# Patient Record
Sex: MALE | Race: WHITE | NOT HISPANIC OR LATINO | Employment: FULL TIME | URBAN - METROPOLITAN AREA
[De-identification: names, ages, dates, MRNs, and addresses within clinical notes are randomized per-mention and may not be internally consistent; named-entity substitution may affect disease eponyms.]

---

## 2017-12-15 ENCOUNTER — HOSPITAL ENCOUNTER (EMERGENCY)
Facility: HOSPITAL | Age: 27
Discharge: HOME | End: 2017-12-15
Attending: EMERGENCY MEDICINE
Payer: COMMERCIAL

## 2017-12-15 VITALS
HEART RATE: 99 BPM | RESPIRATION RATE: 19 BRPM | OXYGEN SATURATION: 96 % | DIASTOLIC BLOOD PRESSURE: 65 MMHG | TEMPERATURE: 98 F | SYSTOLIC BLOOD PRESSURE: 139 MMHG

## 2017-12-15 DIAGNOSIS — F41.9 ANXIETY DISORDER, UNSPECIFIED TYPE: Primary | ICD-10-CM

## 2017-12-15 PROBLEM — F39 MOOD DISORDER: Status: ACTIVE | Noted: 2017-12-15

## 2017-12-15 LAB
ALBUMIN SERPL BCP-MCNC: 4.5 G/DL
ALP SERPL-CCNC: 80 U/L
ALT SERPL W/O P-5'-P-CCNC: 35 U/L
AMPHET+METHAMPHET UR QL: NEGATIVE
ANION GAP SERPL CALC-SCNC: 8 MMOL/L
AST SERPL-CCNC: 23 U/L
BARBITURATES UR QL SCN>200 NG/ML: NEGATIVE
BASOPHILS # BLD AUTO: 0.01 K/UL
BASOPHILS NFR BLD: 0.2 %
BENZODIAZ UR QL SCN>200 NG/ML: NORMAL
BILIRUB SERPL-MCNC: 0.5 MG/DL
BILIRUB UR QL STRIP: NEGATIVE
BUN SERPL-MCNC: 15 MG/DL
BZE UR QL SCN: NEGATIVE
CALCIUM SERPL-MCNC: 9.5 MG/DL
CANNABINOIDS UR QL SCN: NORMAL
CHLORIDE SERPL-SCNC: 104 MMOL/L
CLARITY UR REFRACT.AUTO: CLEAR
CO2 SERPL-SCNC: 29 MMOL/L
COLOR UR AUTO: YELLOW
CREAT SERPL-MCNC: 1.1 MG/DL
CREAT UR-MCNC: 153 MG/DL
DIFFERENTIAL METHOD: ABNORMAL
EOSINOPHIL # BLD AUTO: 0 K/UL
EOSINOPHIL NFR BLD: 0.3 %
ERYTHROCYTE [DISTWIDTH] IN BLOOD BY AUTOMATED COUNT: 12.9 %
EST. GFR  (AFRICAN AMERICAN): >60 ML/MIN/1.73 M^2
EST. GFR  (NON AFRICAN AMERICAN): >60 ML/MIN/1.73 M^2
GLUCOSE SERPL-MCNC: 102 MG/DL
GLUCOSE UR QL STRIP: NEGATIVE
HCT VFR BLD AUTO: 39.9 %
HGB BLD-MCNC: 13.9 G/DL
HGB UR QL STRIP: NEGATIVE
IMM GRANULOCYTES # BLD AUTO: 0.04 K/UL
IMM GRANULOCYTES NFR BLD AUTO: 0.7 %
KETONES UR QL STRIP: NEGATIVE
LEUKOCYTE ESTERASE UR QL STRIP: NEGATIVE
LYMPHOCYTES # BLD AUTO: 1.8 K/UL
LYMPHOCYTES NFR BLD: 30.4 %
MAGNESIUM SERPL-MCNC: 2.3 MG/DL
MCH RBC QN AUTO: 32.1 PG
MCHC RBC AUTO-ENTMCNC: 34.8 G/DL
MCV RBC AUTO: 92 FL
METHADONE UR QL SCN>300 NG/ML: NEGATIVE
MONOCYTES # BLD AUTO: 0.5 K/UL
MONOCYTES NFR BLD: 8.8 %
NEUTROPHILS # BLD AUTO: 3.6 K/UL
NEUTROPHILS NFR BLD: 59.6 %
NITRITE UR QL STRIP: NEGATIVE
NRBC BLD-RTO: 0 /100 WBC
OPIATES UR QL SCN: NORMAL
PCP UR QL SCN>25 NG/ML: NEGATIVE
PH UR STRIP: 5 [PH] (ref 5–8)
PLATELET # BLD AUTO: 272 K/UL
PMV BLD AUTO: 10.6 FL
POTASSIUM SERPL-SCNC: 4.2 MMOL/L
PROT SERPL-MCNC: 7.9 G/DL
PROT UR QL STRIP: NEGATIVE
RBC # BLD AUTO: 4.33 M/UL
SODIUM SERPL-SCNC: 141 MMOL/L
SP GR UR STRIP: 1.02 (ref 1–1.03)
TOXICOLOGY INFORMATION: NORMAL
TSH SERPL DL<=0.005 MIU/L-ACNC: 1.23 UIU/ML
URN SPEC COLLECT METH UR: NORMAL
UROBILINOGEN UR STRIP-ACNC: NEGATIVE EU/DL
WBC # BLD AUTO: 5.99 K/UL

## 2017-12-15 PROCEDURE — 25000003 PHARM REV CODE 250: Performed by: STUDENT IN AN ORGANIZED HEALTH CARE EDUCATION/TRAINING PROGRAM

## 2017-12-15 PROCEDURE — 96360 HYDRATION IV INFUSION INIT: CPT

## 2017-12-15 PROCEDURE — 84443 ASSAY THYROID STIM HORMONE: CPT

## 2017-12-15 PROCEDURE — 99284 EMERGENCY DEPT VISIT MOD MDM: CPT | Mod: 25

## 2017-12-15 PROCEDURE — 93010 ELECTROCARDIOGRAM REPORT: CPT | Mod: ,,, | Performed by: INTERNAL MEDICINE

## 2017-12-15 PROCEDURE — 80053 COMPREHEN METABOLIC PANEL: CPT

## 2017-12-15 PROCEDURE — 81003 URINALYSIS AUTO W/O SCOPE: CPT

## 2017-12-15 PROCEDURE — 85025 COMPLETE CBC W/AUTO DIFF WBC: CPT

## 2017-12-15 PROCEDURE — 83735 ASSAY OF MAGNESIUM: CPT

## 2017-12-15 PROCEDURE — 93005 ELECTROCARDIOGRAM TRACING: CPT

## 2017-12-15 PROCEDURE — 80307 DRUG TEST PRSMV CHEM ANLYZR: CPT

## 2017-12-15 PROCEDURE — 80175 DRUG SCREEN QUAN LAMOTRIGINE: CPT

## 2017-12-15 PROCEDURE — 99283 EMERGENCY DEPT VISIT LOW MDM: CPT | Mod: ,,, | Performed by: EMERGENCY MEDICINE

## 2017-12-15 RX ORDER — LAMOTRIGINE 25 MG/1
25 TABLET ORAL DAILY
COMMUNITY
End: 2017-12-15 | Stop reason: SINTOL

## 2017-12-15 RX ADMIN — SODIUM CHLORIDE, SODIUM LACTATE, POTASSIUM CHLORIDE, AND CALCIUM CHLORIDE 1000 ML: .6; .31; .03; .02 INJECTION, SOLUTION INTRAVENOUS at 02:12

## 2017-12-15 NOTE — SUBJECTIVE & OBJECTIVE
Patient History           Medical as of 12/15/2017     Past Medical History     Diagnosis Date Comments Source    Anxiety -- -- Provider    History of cold sores -- -- Provider    HSV infection -- oral Provider    Hx of psychiatric care -- Previous medications: Celexa, gabapentin, Abilify Provider    Rupali -- elevated mood, increased goal-directed activity, impulsivity, grandiosity, pressured speech Provider    Psychiatric problem -- -- Provider    Therapy -- Psychiatrist: Dr. Urbina.  Provider          Pertinent Negatives     Diagnosis Date Noted Comments Source    History of psychiatric hospitalization 12/15/2017 -- Provider    Hyperlipidemia 10/2/2014 -- Provider    Suicide attempt 12/15/2017 -- Provider                  Surgical as of 12/15/2017    Past Surgical History: Patient provided no pertinent surgical history.           Family as of 12/15/2017     Problem Relation Name Age of Onset Comments Source    No Known Problems Mother -- -- -- Provider    Hyperlipidemia Father -- -- -- Provider    No Known Problems Sister -- -- -- Provider    No Known Problems Brother -- -- -- Provider    Cancer Maternal Grandfather -- -- melanoma Provider    Hyperlipidemia Paternal Grandfather -- -- -- Provider    Heart disease Paternal Grandfather -- -- -- Provider    Hypertension Paternal Grandfather -- -- -- Provider    Diabetes Neg Hx -- -- -- Provider            Tobacco Use as of 12/15/2017     Smoking Status Smoking Start Date Smoking Quit Date Packs/day Years Used    Former Smoker -- 8/2/2014 0.25 2.50    Types Comments Smokeless Tobacco Status Smokeless Tobacco Quit Date Source    -- -- Unknown -- Provider            Alcohol Use as of 12/15/2017     Alcohol Use Drinks/Week Alcohol/Week Comments Source    Yes -- -- 4-5 glasses of wine every 2 weeks Provider            Drug Use as of 12/15/2017     Drug Use Types Frequency Comments Source    Yes -- -- Vapes THC oil X1 month  Provider            Sexual Activity as of  12/15/2017     Sexually Active Birth Control Partners Comments Source    Yes OCP Female -- Provider            Activities of Daily Living as of 12/15/2017     Activities of Daily Living Question Response Comments Source    Patient feels they ought to cut down on drinking/drug use Not Asked -- Provider    Patient annoyed by others criticizing their drinking/drug use Not Asked -- Provider    Patient has felt bad or guilty about drinking/drug use Not Asked -- Provider    Patient has had a drink/used drugs as an eye opener in the AM Not Asked -- Provider            Social Documentation as of 12/15/2017    **None**           Occupational as of 12/15/2017     Occupation Employer Comments Source    [employer]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 No      Service Yes [employer]    Primary Leisure Activity      Spirituality       Past Medical History:   Diagnosis Date    Anxiety     History of cold sores     HSV infection     oral    Hx of psychiatric care     Previous medications: Celexa, gabapentin, Abilify    Rupali     elevated mood, increased goal-directed activity, impulsivity, grandiosity, pressured speech    Psychiatric problem     Therapy     Psychiatrist: Dr. Urbina.      History reviewed. No pertinent surgical history.  Family History     Problem Relation (Age of Onset)    Cancer Maternal Grandfather     Heart disease Paternal Grandfather    Hyperlipidemia Father, Paternal Grandfather    Hypertension Paternal Grandfather    No Known Problems Mother, Sister, Brother        Social History Main Topics    Smoking status: Former Smoker     Packs/day: 0.25     Years: 2.50     Quit date: 8/2/2014    Smokeless tobacco: Not on file    Alcohol use Yes      Comment: 4-5 glasses of wine every 2 weeks    Drug use: Yes      Comment: Vapes THC oil X1 month     Sexual activity: Yes     Partners: Female     Birth control/ protection: OCP     Review of patient's allergies indicates:  No Known Allergies    No current facility-administered medications on file prior to encounter.      Current Outpatient Prescriptions on File Prior to Encounter   Medication Sig    clonazePAM (KLONOPIN) 2 MG Tab     duloxetine (CYMBALTA) 20 MG capsule     propranolol (INDERAL) 40 MG tablet     valacyclovir (VALTREX) 1000 MG tablet     dextromethorphan-guaifenesin  mg/5 ml (ROBITUSSIN-DM)  mg/5 mL liquid Take 5 mLs by mouth every 4 (four) hours as needed.     Psychotherapeutics     None        Review of Systems   Constitutional: Negative for chills and fever.   HENT: Negative for sore throat.    Respiratory: Negative for cough and shortness of breath.    Cardiovascular: Negative for chest pain.   Gastrointestinal: Negative for abdominal pain, constipation, diarrhea, nausea and vomiting.   Genitourinary: Negative for dysuria and hematuria.   Skin: Negative for rash.        Does endorse skin picking when stressed   Neurological: Negative for headaches.   Psychiatric/Behavioral: Negative for hallucinations, self-injury and suicidal ideas. The patient is nervous/anxious.      Strengths and Liabilities: Strength: Patient accepts guidance/feedback, Strength: Patient is expressive/articulate., Strength: Patient is intelligent., Strength: Patient is motivated for change., Strength: Patient is physically healthy., Strength: Patient has  "positive support network., Strength: Patient has reasonable judgment.    Objective:     Vital Signs (Most Recent):  Temp: 98.3 °F (36.8 °C) (12/15/17 1218)  Pulse: 99 (12/15/17 1632)  Resp: 19 (12/15/17 1437)  BP: 139/65 (12/15/17 1632)  SpO2: 96 % (12/15/17 1633) Vital Signs (24h Range):  Temp:  [98.3 °F (36.8 °C)] 98.3 °F (36.8 °C)  Pulse:  [] 99  Resp:  [19-20] 19  SpO2:  [95 %-99 %] 96 %  BP: (105-149)/(60-92) 139/65           There is no height or weight on file to calculate BMI.    No intake or output data in the 24 hours ending 12/15/17 1707    Physical Exam   Constitutional: He appears well-developed and well-nourished. No distress.   HENT:   Head: Normocephalic and atraumatic.   Neck: Normal range of motion.   Pulmonary/Chest: Effort normal. No respiratory distress.   Abdominal: He exhibits no distension.   Musculoskeletal: Normal range of motion. He exhibits no edema.   AIMS: 0   Skin: Skin is warm and dry.   Psychiatric:   Psychiatric Mental Status Exam:  Arousal: alert  Sensorium/Orientation: oriented to person, place, situation, time/date  Behavior/Cooperation: normal, cooperative, eye contact normal   Psychomotor: unremarkable and within normal limits  Speech: rapid speech, but conversational volume, rhythm, and tone  Language: intact  Mood: "melancoly"   Affect: appropriate, and reactive to content  Thought Process: linear, logical  Thought Content:   Auditory hallucinations: NO  Visual hallucinations: NO  Paranoia: NO  Delusions:  NO  Suicidal ideation: NO  Homicidal ideation: NO  Attention/Concentration:  spelled "HOUSE" forwards and backwards  able to focus  Memory:    Recent: Intact   Remote: Intact  Fund of Knowledge: Aware of current events and Vocabulary appropriate    Abstract reasoning: proverbs were abstract  Insight: Intact  Judgment:Intact      NEUROLOGICAL EXAMINATION:     MENTAL STATUS        II: Visual fields full to confrontation  III,IV,VI: PERRLA, EOMI. No nystagmus   V: " sensation to light touch intact throughout face  VII: Symmetric smile and brow raise  VIII: Able to hear finger rub equally bilaterally   IX,X: Symmetric elevation of the soft palate   XI: Able to shrug shoulders bilaterally   XII: tongue midline on protrusion      Significant Labs:   Last 24 Hours:   Recent Lab Results       12/15/17  1434 12/15/17  1344      Benzodiazepines Presumptive Positive      Methadone metabolites Negative      Phencyclidine Negative      Immature Granulocytes  0.7(H)     Immature Grans (Abs)  0.04     Albumin  4.5     Alkaline Phosphatase  80     ALT  35     Amphetamine Screen, Ur Negative      Anion Gap  8     Appearance, UA Clear      AST  23     Barbiturate Screen, Ur Negative      Baso #  0.01     Basophil%  0.2     Bilirubin (UA) Negative      Total Bilirubin  0.5  Comment:  For infants and newborns, interpretation of results should be based  on gestational age, weight and in agreement with clinical  observations.  Premature Infant recommended reference ranges:  Up to 24 hours.............<8.0 mg/dL  Up to 48 hours............<12.0 mg/dL  3-5 days..................<15.0 mg/dL  6-29 days.................<15.0 mg/dL       BUN, Bld  15     Calcium  9.5     Chloride  104     CO2  29     Cocaine (Metab.) Negative      Color, UA Yellow      Creatinine  1.1     Creatinine, Random Ur 153.0  Comment:  The random urine reference ranges provided were established   for 24 hour urine collections.  No reference ranges exist for  random urine specimens.  Correlate clinically.        Differential Method  Automated     eGFR if African American  >60.0     eGFR if non   >60.0  Comment:  Calculation used to obtain the estimated glomerular filtration  rate (eGFR) is the CKD-EPI equation.        Eos #  0.0     Eosinophil%  0.3     Glucose  102     Glucose, UA Negative      Gran #  3.6     Gran%  59.6     Hematocrit  39.9(L)     Hemoglobin  13.9(L)     Ketones, UA Negative      Leukocytes,  UA Negative      Lymph #  1.8     Lymph%  30.4     Magnesium  2.3     MCH  32.1(H)     MCHC  34.8     MCV  92     Mono #  0.5     Mono%  8.8     MPV  10.6     Nitrite, UA Negative      nRBC  0     Occult Blood UA Negative      Opiate Scrn, Ur Presumptive Positive      pH, UA 5.0      Platelets  272     Potassium  4.2     Total Protein  7.9     Protein, UA Negative  Comment:  Recommend a 24 hour urine protein or a urine   protein/creatinine ratio if globulin induced proteinuria is  clinically suspected.        RBC  4.33(L)     RDW  12.9     Sodium  141     Specific Gravity, UA 1.025      Specimen UA Urine, Clean Catch      Marijuana (THC) Metabolite Presumptive Positive      Toxicology Information SEE COMMENT  Comment:  This screen includes the following classes of drugs at the   listed cut-off:  Benzodiazepines                  200 ng/ml  Methadone                        300 ng/ml  Cocaine metabolite               300 ng/ml  Opiates                          300 ng/ml  Barbiturates                     200 ng/ml  Amphetamines                    1000 ng/ml  Marijuana metabs (THC)            50 ng/ml  Phencyclidine (PCP)               25 ng/ml  High concentrations of Diphenhydramine may cross-react with  Phencyclidine PCP screening immunoassay giving a false   positive result.  High concentrations of Methylenedioxymethamphetamine (MDMA aka  Ectasy) and other structurally similar compounds may cross-   react with the Amphetamine/Methamphetamine screening   immunoassay giving a false positive result.  A metabolite of the anti-HIV drug Sustiva () may cause  false positive results in the Marijuana metabolite (THC)   screening assay.  Note: This exception list includes only more common   interferants in toxicology screen testing.  Because of many   cross-reactantspositive results on toxicology drug screens   should be confirmed whenever results do not correlate with   clinical presentation.  This report is intended  for use in clinical monitoring and  management of patients. It is not intended for use in   employment related drug testing.  Because of any cross-reactants, positive results on toxicology  drug screens should be confirmed whenever results do not  correlate with clinical presentation.  Presumptive positive results are unconfirmed and may be used   only for medical purposes.        TSH  1.234     Urobilinogen, UA Negative      WBC  5.99           Significant Imaging: CT: I have reviewed all pertinent results/findings within the past 24 hours. No intracranial abnormality identified.

## 2017-12-15 NOTE — ED NOTES
Patient identifiers verified and correct for Samuel Mcgraw.    LOC: The patient is awake, alert and aware of environment, the patient is oriented x 3 and speaking appropriately.  APPEARANCE: Patient resting comfortably and in no acute distress, patient is clean and well groomed, patient's clothing is properly fastened.  SKIN: The skin is warm and dry, color consistent with ethnicity, patient has normal skin turgor and moist mucus membranes, rash noted to face and neck.  MUSCULOSKELETAL: Patient moving all extremities spontaneously, no obvious swelling or deformities noted.  RESPIRATORY: Airway is open and patent, respirations are spontaneous, patient has a normal effort and rate, no accessory muscle use noted.  CARDIAC: Patient is tachycardic with a regular rhythm, no peripheral edema noted, capillary refill < 3 seconds.  ABDOMEN: Soft and non tender to palpation, no distention noted.  NEUROLOGIC: Eyes open spontaneously, behavior appropriate to situation, follows commands, facial expression symmetrical, bilateral hand grasp equal and even, purposeful motor response noted, normal sensation in all extremities when touched with a finger.

## 2017-12-15 NOTE — ASSESSMENT & PLAN NOTE
- Treated by outside provider. Has been on current medications for years without changes except for addition of Lamictal yesterday and use of Seroquel for the first time in awhile. Patient also uses vaporized THC oil which could complicate the picture. Returning to baseline, per patient's family at bedside.  - No current SI, HI, AVH, delusions, or paranoia. Able to hold a 2-way conversation. Contracts for safety, and has appointment with outpatient provider within 2 weeks.  - Instructed to hold Lamictal and Seroquel until discussing with outpatient provider Monday, or at next appointment.    - Dispo/Legal Status: Patient does not meet criteria for PEC or inpatient psychiatric admission at this time. Recommend to rescind PEC if one was placed. Patient is not currently an imminent danger to self or others and is not gravely disabled due to a psychiatric illness. Patient is cleared from a psychiatric standpoint and can be discharged to home/self-care  - Medication Recommendations: Resume home meds with the exception of Lamictal and Seroquel  - Follow-up with: Dr. Urbina      - Keep all follow up appointments.  - Abstain from all drugs and alcohol.  - Call the crisis line at: [phone] for help in a crisis.  - Call 911 for any emergent situation.  - Return to the hospital or call your primary care physician if you have any worsening of your condition or experience suicidal or homicidal thoughts.

## 2017-12-15 NOTE — ED TRIAGE NOTES
"Pt presents to the ED c/o medication reaction to lamictal. Yesterday was the first dose of lamictal. No new rash. Pt has pressure speech, rambling, flight of ideas. Pt is fidgeting and hyperactive. Denies SI/HI, AV/VH. AAOx4. Pt states he sees a psychiatrist for his anxiety. Pt's wife states the pt's baseline is "normal speech." Pt's wife states the pt doubled his dose of propranolol and Klonopin this AM.  "

## 2017-12-15 NOTE — HPI
"Patient presented today after 1 day of "not feeling right." States he saw his outpatient psychiatrist (Dr. Urbina) yesterday, and they decided to start Lamictal. Today was only the 2nd day he has taken the medication. Patient also takes Cymbalta, propranolol, Klonopin PRN, and Seroquel PRN. Has been on these medications for years, but rarely takes Seroquel; however, he did take Seroquel last night for the first time in a long time. Patient report that he usually has pretty rapid speech at baseline, but felt more ramped up today than usual. Mother and wife at bedside agree that patient is returning to baseline currently, but were scared this morning. Patient reports that he has only been diagnosed with and anxiety disorder, but that he and his provider were exploring a possible Bipolar disorder diagnosis. Does endorse episodes of decreased need for sleep with associated impulsivity, grandiosity, pressured speech, and racing thoughts that would last for 3-4 days and occur ~1/month with periods of depression between episodes. Denies any current or past SI, HI, AVH, delusions, or paranoia. Denies any somatic complaints at this time. Feels safe to be discharged, can contract for safety, is aware of return criteria, and has a follow up appointment with his outpatient provider within the next 2 weeks.  "

## 2017-12-15 NOTE — CONSULTS
"[facility]  Psychiatry  Consult Note    Patient Name: Samuel Mcgraw  MRN: [id number]   Code Status: No Order  Admission Date: 12/15/2017  Hospital Length of Stay: 0 days  Attending Physician: No att. providers found  Primary Care Provider: Primary Doctor No    Current Legal Status: N/A    Patient information was obtained from patient, spouse/SO, parent and ER records.   Inpatient consult to Psychiatry  Consult performed by: EYAD WOLFE  Consult ordered by: ALEKS EASON        Subjective:     Principal Problem:<principal problem not specified>    Chief Complaint:  "I want to see if my medicines are okay"     HPI: Patient presented today after 1 day of "not feeling right." States he saw his outpatient psychiatrist (Dr. Urbina) yesterday, and they decided to start Lamictal. Today was only the 2nd day he has taken the medication. Patient also takes Cymbalta, propranolol, Klonopin PRN, and Seroquel PRN. Has been on these medications for years, but rarely takes Seroquel; however, he did take Seroquel last night for the first time in a long time. Patient report that he usually has pretty rapid speech at baseline, but felt more ramped up today than usual. Mother and wife at bedside agree that patient is returning to baseline currently, but were scared this morning. Patient reports that he has only been diagnosed with and anxiety disorder, but that he and his provider were exploring a possible Bipolar disorder diagnosis. Does endorse episodes of decreased need for sleep with associated impulsivity, grandiosity, pressured speech, and racing thoughts that would last for 3-4 days and occur ~1/month with periods of depression between episodes. Denies any current or past SI, HI, AVH, delusions, or paranoia. Denies any somatic complaints at this time. Feels safe to be discharged, can contract for safety, is aware of return criteria, and has a follow up appointment with his outpatient provider within " the next 2 weeks.    Hospital Course: No notes on file         Patient History           Medical as of 12/15/2017     Past Medical History     Diagnosis Date Comments Source    Anxiety -- -- Provider    History of cold sores -- -- Provider    HSV infection -- oral Provider    Hx of psychiatric care -- Previous medications: Celexa, gabapentin, Abilify Provider    Rupali -- elevated mood, increased goal-directed activity, impulsivity, grandiosity, pressured speech Provider    Psychiatric problem -- -- Provider    Therapy -- Psychiatrist: Dr. Urbina.  Provider          Pertinent Negatives     Diagnosis Date Noted Comments Source    History of psychiatric hospitalization 12/15/2017 -- Provider    Hyperlipidemia 10/2/2014 -- Provider    Suicide attempt 12/15/2017 -- Provider                  Surgical as of 12/15/2017    Past Surgical History: Patient provided no pertinent surgical history.           Family as of 12/15/2017     Problem Relation Name Age of Onset Comments Source    No Known Problems Mother -- -- -- Provider    Hyperlipidemia Father -- -- -- Provider    No Known Problems Sister -- -- -- Provider    No Known Problems Brother -- -- -- Provider    Cancer Maternal Grandfather -- -- melanoma Provider    Hyperlipidemia Paternal Grandfather -- -- -- Provider    Heart disease Paternal Grandfather -- -- -- Provider    Hypertension Paternal Grandfather -- -- -- Provider    Diabetes Neg Hx -- -- -- Provider            Tobacco Use as of 12/15/2017     Smoking Status Smoking Start Date Smoking Quit Date Packs/day Years Used    Former Smoker -- 8/2/2014 0.25 2.50    Types Comments Smokeless Tobacco Status Smokeless Tobacco Quit Date Source    -- -- Unknown -- Provider            Alcohol Use as of 12/15/2017     Alcohol Use Drinks/Week Alcohol/Week Comments Source    Yes -- -- 4-5 glasses of wine every 2 weeks Provider            Drug Use as of 12/15/2017     Drug Use Types Frequency Comments Source    Yes -- -- Vapes THC  oil X1 month  Provider            Sexual Activity as of 12/15/2017     Sexually Active Birth Control Partners Comments Source    Yes OCP Female -- Provider            Activities of Daily Living as of 12/15/2017     Activities of Daily Living Question Response Comments Source    Patient feels they ought to cut down on drinking/drug use Not Asked -- Provider    Patient annoyed by others criticizing their drinking/drug use Not Asked -- Provider    Patient has felt bad or guilty about drinking/drug use Not Asked -- Provider    Patient has had a drink/used drugs as an eye opener in the AM Not Asked -- Provider            Social Documentation as of 12/15/2017    **None**           Occupational as of 12/15/2017     Occupation Employer Comments Source    [employer] Provider            Socioeconomic as of 12/15/2017     Marital Status Spouse Name Number of Children Years Education Preferred Language Ethnicity Race Source     -- -- -- English /White White Provider         Pertinent History Q A Comments    as of 12/15/2017 Lives with spouse     Place in Birth Order      Lives in      Number of Siblings      Raised by biological parents     Legal Involvement none     Childhood Trauma      Criminal History of      Financial Status employed     Highest Level of Education Bachelor's Degree     Does patient have access to a firearm? No      Service Yes [employer]    Primary Leisure Activity      Spirituality       Past Medical History:   Diagnosis Date    Anxiety     History of cold sores     HSV infection     oral    Hx of psychiatric care     Previous medications: Celexa, gabapentin, Abilify    Rupali     elevated mood, increased goal-directed activity, impulsivity, grandiosity, pressured speech    Psychiatric problem     Therapy     Psychiatrist: Dr. Urbina.      History reviewed. No pertinent surgical history.  Family History     Problem  Relation (Age of Onset)    Cancer Maternal Grandfather    Heart disease Paternal Grandfather    Hyperlipidemia Father, Paternal Grandfather    Hypertension Paternal Grandfather    No Known Problems Mother, Sister, Brother        Social History Main Topics    Smoking status: Former Smoker     Packs/day: 0.25     Years: 2.50     Quit date: 8/2/2014    Smokeless tobacco: Not on file    Alcohol use Yes      Comment: 4-5 glasses of wine every 2 weeks    Drug use: Yes      Comment: Vapes THC oil X1 month     Sexual activity: Yes     Partners: Female     Birth control/ protection: OCP     Review of patient's allergies indicates:  No Known Allergies    No current facility-administered medications on file prior to encounter.      Current Outpatient Prescriptions on File Prior to Encounter   Medication Sig    clonazePAM (KLONOPIN) 2 MG Tab     duloxetine (CYMBALTA) 20 MG capsule     propranolol (INDERAL) 40 MG tablet     valacyclovir (VALTREX) 1000 MG tablet     dextromethorphan-guaifenesin  mg/5 ml (ROBITUSSIN-DM)  mg/5 mL liquid Take 5 mLs by mouth every 4 (four) hours as needed.     Psychotherapeutics     None        Review of Systems   Constitutional: Negative for chills and fever.   HENT: Negative for sore throat.    Respiratory: Negative for cough and shortness of breath.    Cardiovascular: Negative for chest pain.   Gastrointestinal: Negative for abdominal pain, constipation, diarrhea, nausea and vomiting.   Genitourinary: Negative for dysuria and hematuria.   Skin: Negative for rash.        Does endorse skin picking when stressed   Neurological: Negative for headaches.   Psychiatric/Behavioral: Negative for hallucinations, self-injury and suicidal ideas. The patient is nervous/anxious.      Strengths and Liabilities: Strength: Patient accepts guidance/feedback, Strength: Patient is expressive/articulate., Strength: Patient is intelligent., Strength: Patient is motivated for change., Strength:  "Patient is physically healthy., Strength: Patient has positive support network., Strength: Patient has reasonable judgment.    Objective:     Vital Signs (Most Recent):  Temp: 98.3 °F (36.8 °C) (12/15/17 1218)  Pulse: 99 (12/15/17 1632)  Resp: 19 (12/15/17 1437)  BP: 139/65 (12/15/17 1632)  SpO2: 96 % (12/15/17 1633) Vital Signs (24h Range):  Temp:  [98.3 °F (36.8 °C)] 98.3 °F (36.8 °C)  Pulse:  [] 99  Resp:  [19-20] 19  SpO2:  [95 %-99 %] 96 %  BP: (105-149)/(60-92) 139/65           There is no height or weight on file to calculate BMI.    No intake or output data in the 24 hours ending 12/15/17 1707    Physical Exam   Constitutional: He appears well-developed and well-nourished. No distress.   HENT:   Head: Normocephalic and atraumatic.   Neck: Normal range of motion.   Pulmonary/Chest: Effort normal. No respiratory distress.   Abdominal: He exhibits no distension.   Musculoskeletal: Normal range of motion. He exhibits no edema.   AIMS: 0   Skin: Skin is warm and dry.   Psychiatric:   Psychiatric Mental Status Exam:  Arousal: alert  Sensorium/Orientation: oriented to person, place, situation, time/date  Behavior/Cooperation: normal, cooperative, eye contact normal   Psychomotor: unremarkable and within normal limits  Speech: rapid speech, but conversational volume, rhythm, and tone  Language: intact  Mood: "melancoly"   Affect: appropriate, and reactive to content  Thought Process: linear, logical  Thought Content:   Auditory hallucinations: NO  Visual hallucinations: NO  Paranoia: NO  Delusions:  NO  Suicidal ideation: NO  Homicidal ideation: NO  Attention/Concentration:  spelled "HOUSE" forwards and backwards  able to focus  Memory:    Recent: Intact   Remote: Intact  Fund of Knowledge: Aware of current events and Vocabulary appropriate    Abstract reasoning: proverbs were abstract  Insight: Intact  Judgment:Intact      NEUROLOGICAL EXAMINATION:     MENTAL STATUS        II: Visual fields full to " confrontation  III,IV,VI: PERRLA, EOMI. No nystagmus   V: sensation to light touch intact throughout face  VII: Symmetric smile and brow raise  VIII: Able to hear finger rub equally bilaterally   IX,X: Symmetric elevation of the soft palate   XI: Able to shrug shoulders bilaterally   XII: tongue midline on protrusion      Significant Labs:   Last 24 Hours:   Recent Lab Results       12/15/17  1434 12/15/17  1344      Benzodiazepines Presumptive Positive      Methadone metabolites Negative      Phencyclidine Negative      Immature Granulocytes  0.7(H)     Immature Grans (Abs)  0.04     Albumin  4.5     Alkaline Phosphatase  80     ALT  35     Amphetamine Screen, Ur Negative      Anion Gap  8     Appearance, UA Clear      AST  23     Barbiturate Screen, Ur Negative      Baso #  0.01     Basophil%  0.2     Bilirubin (UA) Negative      Total Bilirubin  0.5  Comment:  For infants and newborns, interpretation of results should be based  on gestational age, weight and in agreement with clinical  observations.  Premature Infant recommended reference ranges:  Up to 24 hours.............<8.0 mg/dL  Up to 48 hours............<12.0 mg/dL  3-5 days..................<15.0 mg/dL  6-29 days.................<15.0 mg/dL       BUN, Bld  15     Calcium  9.5     Chloride  104     CO2  29     Cocaine (Metab.) Negative      Color, UA Yellow      Creatinine  1.1     Creatinine, Random Ur 153.0  Comment:  The random urine reference ranges provided were established   for 24 hour urine collections.  No reference ranges exist for  random urine specimens.  Correlate clinically.        Differential Method  Automated     eGFR if African American  >60.0     eGFR if non   >60.0  Comment:  Calculation used to obtain the estimated glomerular filtration  rate (eGFR) is the CKD-EPI equation.        Eos #  0.0     Eosinophil%  0.3     Glucose  102     Glucose, UA Negative      Gran #  3.6     Gran%  59.6     Hematocrit  39.9(L)      Hemoglobin  13.9(L)     Ketones, UA Negative      Leukocytes, UA Negative      Lymph #  1.8     Lymph%  30.4     Magnesium  2.3     MCH  32.1(H)     MCHC  34.8     MCV  92     Mono #  0.5     Mono%  8.8     MPV  10.6     Nitrite, UA Negative      nRBC  0     Occult Blood UA Negative      Opiate Scrn, Ur Presumptive Positive      pH, UA 5.0      Platelets  272     Potassium  4.2     Total Protein  7.9     Protein, UA Negative  Comment:  Recommend a 24 hour urine protein or a urine   protein/creatinine ratio if globulin induced proteinuria is  clinically suspected.        RBC  4.33(L)     RDW  12.9     Sodium  141     Specific Gravity, UA 1.025      Specimen UA Urine, Clean Catch      Marijuana (THC) Metabolite Presumptive Positive      Toxicology Information SEE COMMENT  Comment:  This screen includes the following classes of drugs at the   listed cut-off:  Benzodiazepines                  200 ng/ml  Methadone                        300 ng/ml  Cocaine metabolite               300 ng/ml  Opiates                          300 ng/ml  Barbiturates                     200 ng/ml  Amphetamines                    1000 ng/ml  Marijuana metabs (THC)            50 ng/ml  Phencyclidine (PCP)               25 ng/ml  High concentrations of Diphenhydramine may cross-react with  Phencyclidine PCP screening immunoassay giving a false   positive result.  High concentrations of Methylenedioxymethamphetamine (MDMA aka  Ectasy) and other structurally similar compounds may cross-   react with the Amphetamine/Methamphetamine screening   immunoassay giving a false positive result.  A metabolite of the anti-HIV drug Sustiva () may cause  false positive results in the Marijuana metabolite (THC)   screening assay.  Note: This exception list includes only more common   interferants in toxicology screen testing.  Because of many   cross-reactantspositive results on toxicology drug screens   should be confirmed whenever results do not  correlate with   clinical presentation.  This report is intended for use in clinical monitoring and  management of patients. It is not intended for use in   employment related drug testing.  Because of any cross-reactants, positive results on toxicology  drug screens should be confirmed whenever results do not  correlate with clinical presentation.  Presumptive positive results are unconfirmed and may be used   only for medical purposes.        TSH  1.234     Urobilinogen, UA Negative      WBC  5.99           Significant Imaging: CT: I have reviewed all pertinent results/findings within the past 24 hours. No intracranial abnormality identified.    Assessment/Plan:     Mood disorder    - Treated by outside provider. Has been on current medications for years without changes except for addition of Lamictal yesterday and use of Seroquel for the first time in awhile. Patient also uses vaporized THC oil which could complicate the picture. Returning to baseline, per patient's family at bedside.  - No current SI, HI, AVH, delusions, or paranoia. Able to hold a 2-way conversation. Contracts for safety, and has appointment with outpatient provider within 2 weeks.  - Instructed to hold Lamictal and Seroquel until discussing with outpatient provider Monday, or at next appointment.    - Dispo/Legal Status: Patient does not meet criteria for PEC or inpatient psychiatric admission at this time. Recommend to rescind PEC if one was placed. Patient is not currently an imminent danger to self or others and is not gravely disabled due to a psychiatric illness. Patient is cleared from a psychiatric standpoint and can be discharged to home/self-care  - Medication Recommendations: Resume home meds with the exception of Lamictal and Seroquel  - Follow-up with: Dr. Urbina      - Keep all follow up appointments.  - Abstain from all drugs and alcohol.  - Call the crisis line at: [phone] for help in a crisis.  - Call 911 for any emergent  situation.  - Return to the hospital or call your primary care physician if you have any worsening of your condition or experience suicidal or homicidal thoughts.                Total Time:  60 minutes      Pop Pedroza MD   Psychiatry  [facility]

## 2017-12-15 NOTE — ED PROVIDER NOTES
"Encounter Date: 12/15/2017    SCRIBE #1 NOTE: I, Hilda Pierce, am scribing for, and in the presence of,  Dr. Madrid. I have scribed the following portions of the note - the EKG reading and the Resident attestation.       History     Chief Complaint   Patient presents with    Medication Reaction     recent change in psych medications, reports flight of ideas, falling asleep, sweating. lamicatal took today. Constant body fidgeting with intermittent falling asleep in triage. Denies Si or HI     Mr. Mcgraw is a 27 year old male with a past medical history of anxiety presents to the ED with apparent medication reaction. Per patient and family patient was recently start on lamotrigine yesterday by his psychiatrist. Patient and family states the only psychiatric diagnosis he carries is anxiety, although they state recently Bipolar disorder was brought up but he was never diagnosed. Patient presents to ED diaphoretic, with pressured speech, anxiety, pressured speech, alternating hyperactive/drowsy episodes. Patient's mother states she found him in his office around 11AM this morning and contacted his psychiatrist to ask for advice, states she was advised to present to ED. Patient is "normal" at baseline, exhibiting none of the symptoms he presents with. Patient states he has taken 2 doses of 25mg lamotrigine since being prescribed, last dose this morning. Patient also states he took quetiapine last night to try and help fall asleep. Patient denies any SI/HI/AVH.            Review of patient's allergies indicates:  No Known Allergies  Past Medical History:   Diagnosis Date    Anxiety     History of cold sores      History reviewed. No pertinent surgical history.  Family History   Problem Relation Age of Onset    No Known Problems Mother     Hyperlipidemia Father     No Known Problems Sister     No Known Problems Brother     Cancer Maternal Grandfather      melanoma    Hyperlipidemia Paternal Grandfather     Heart " disease Paternal Grandfather     Hypertension Paternal Grandfather     Diabetes Neg Hx      Social History   Substance Use Topics    Smoking status: Former Smoker     Packs/day: 0.25     Years: 2.50     Quit date: 8/2/2014    Smokeless tobacco: Not on file    Alcohol use Yes      Comment: 5-6 on Saturdays     Review of Systems   Constitutional: Positive for diaphoresis. Negative for chills, fatigue and fever.   HENT: Negative for congestion, sinus pain, sinus pressure, sore throat and trouble swallowing.    Eyes: Negative for photophobia, pain and visual disturbance.   Respiratory: Positive for chest tightness. Negative for cough and shortness of breath.    Cardiovascular: Negative for chest pain, palpitations and leg swelling.   Gastrointestinal: Positive for diarrhea. Negative for abdominal distention, abdominal pain, blood in stool, constipation, nausea and vomiting.   Genitourinary: Negative for dysuria and hematuria.   Musculoskeletal: Negative for arthralgias, back pain, myalgias, neck pain and neck stiffness.   Skin: Positive for wound. Negative for rash.   Neurological: Negative for dizziness, tremors, syncope, weakness, light-headedness, numbness and headaches.   Hematological: Does not bruise/bleed easily.   Psychiatric/Behavioral: Positive for confusion, decreased concentration, dysphoric mood and sleep disturbance. Negative for agitation, self-injury and suicidal ideas. The patient is nervous/anxious and is hyperactive.        Physical Exam     Initial Vitals   BP Pulse Resp Temp SpO2   12/15/17 1257 12/15/17 1218 12/15/17 1218 12/15/17 1218 12/15/17 1218   134/82 (!) 130 20 98.3 °F (36.8 °C) 99 %      MAP       12/15/17 1257       99.33         Physical Exam    Vitals reviewed.  Constitutional: He appears well-developed.   27 year old  man, drowsy with intermittent mild agitation   HENT:   Head: Normocephalic and atraumatic.   Eyes: EOM are normal. Pupils are equal, round, and reactive to  light.   Neck: No tracheal deviation present.   Cardiovascular: Intact distal pulses.   Moderate tachycardia noted   Pulmonary/Chest: Breath sounds normal. No stridor. No respiratory distress.   Abdominal: Soft. He exhibits no distension. There is no tenderness.   Musculoskeletal: Normal range of motion. He exhibits no edema.   Neurological: He is oriented to person, place, and time.    somnolent, arousable to awake and drowsy   Skin: Skin is warm and dry.   Psychiatric: Judgment normal.   Cooperative with exam, mildly pressured speech         ED Course   Procedures  Labs Reviewed   CBC W/ AUTO DIFFERENTIAL - Abnormal; Notable for the following:        Result Value    RBC 4.33 (*)     Hemoglobin 13.9 (*)     Hematocrit 39.9 (*)     MCH 32.1 (*)     Immature Granulocytes 0.7 (*)     All other components within normal limits   LAMOTRIGINE LEVEL - Abnormal; Notable for the following:     Lamotrigine Lvl 0.3 (*)     All other components within normal limits   COMPREHENSIVE METABOLIC PANEL   URINALYSIS, REFLEX TO URINE CULTURE    Narrative:     Preferred Collection Type->Urine, Clean Catch  YELLOW AND GRAY TOPS   DRUG SCREEN PANEL, URINE EMERGENCY    Narrative:     Preferred Collection Type->Urine, Clean Catch  YELLOW AND GRAY TOPS   MAGNESIUM   TSH     EKG Readings: (Independently Interpreted)   NSR. Normal axis. Normal ST segments at 69 BPM.     Imaging Results          CT Head Without Contrast (Final result)  Result time 12/15/17 16:47:32    Final result by Eulogio Mcneil MD (12/15/17 16:47:32)                 Impression:      No intracranial abnormality is identified, noting limitations related to patient motion.    ______________________________________     Electronically signed by resident: Eulogio Mcneil  Date:     12/15/17  Time:    15:54            As the supervising and teaching physician, I personally reviewed the images and resident's interpretation and I agree with the  findings.            Electronically signed by: ALEKS FERNÁNDEZ MD  Date:     12/15/17  Time:    16:47              Narrative:    Comparison: None    Technique: Noncontrast head CT with sagittal and coronal reformats.    Clinical information: Altered mental status    Findings: Motion limited examination.  No intracranial hemorrhage, mass effect, or hydrocephalus.  No noncontrast CT changes of infarction.  No fracture seen.  The mastoid air cells and paranasal sinuses are clear.                                   Medical Decision Making:   History:   Old Medical Records: I decided to obtain old medical records.  Initial Assessment:   Mr. Mcgraw is a 27 year old male with a past medical history of anxiety presents to the ED after apparent drug reaction following new medication lamotrigine. Patient presents with pressured speech, flight of ideas, anxiety, diaphoresis. Patient denies any other psychiatric diagnoses. Patient denies any SI/HI/AVH. Patient now admits to overusing prescribed clonazepam.     Differential Diagnosis:   Differential includes but is not limited to adverse medication reaction, manic episode, acute psychosis, acute intoxication, delirium, withdrawal. Lamotrigine levels ordered to assess for toxicity. Basic labs to assess for electrolyte abnormalities. Urine drug screen to assess for illicit drug use. Ordered CT head without contrast to rule out acute intracranial process contributing to patients presentation.   Independently Interpreted Test(s):   I have ordered and independently interpreted EKG Reading(s) - see prior notes  Clinical Tests:   Lab Tests: Ordered and Reviewed  Radiological Study: Ordered and Reviewed  Medical Tests: Ordered and Reviewed  ED Management:  1:59 PM  Patient assessed, appears to be in manic like episode with pressured speech, anxiety, agitation, flight of ideas alternating with episodes of somnolence and sleeping . Basic labs and levels ordered. 1L lactated ringers ordered.  Urine drug screen and UA ordered.     3:43 PM  CBC back unremarkable. CMP, mg, UA, TSH normal. UDS positive for THC, benzodiazepine, opiates. Lamotrigine level in process. Awaiting CT head read. Psychiatry consulted will come to evaluate patient.     4:41 PM  Psychiatry assessed patient and feel that he is ok to send home. Unclear whether presentation was result of manic episode or worsening baseline anxiety. Patient with no SI/HI/AVH and has scheduled psychiatric follow up. Labs and imaging unremarkable. Patient medically stable to discharge.  Other:   I have discussed this case with another health care provider.            Scribe Attestation:   Scribe #1: I performed the above scribed service and the documentation accurately describes the services I performed. I attest to the accuracy of the note.    Attending Attestation:   Physician Attestation Statement for Resident:  As the supervising MD   Physician Attestation Statement: I have personally seen and examined this patient.   I agree with the above history. -: 27 y.o. male with a history of anxiety disorder who presents with AMS noted by accompanying wife and mother.   As the supervising MD I agree with the above PE.    As the supervising MD I agree with the above treatment, course, plan, and disposition.    Physician Attestation Statement for NP/PA:       Other NP/PA Attestation Additions:    History of Present Illness: .           Attending ED Notes:   Patient exam noted to improve to awake, oriented, and appropriate.  The patient has been urgently evaluated by psychiatry after medical clearance, and they do not feel that patient exhibits behavior to warrant inpatient management.  The patient will be discharged to home in improved condition with instructions to follow-up closely with his managing psychiatrist.  Findings and outpatient plan have been discussed with the accompanying spouse and mother, and all questions have been answered to their  satisfaction.          ED Course      Clinical Impression:   The encounter diagnosis was Anxiety disorder, unspecified type.    Disposition:   Disposition: Discharged  Condition: Stable                        Arturo Yi MD  Resident  12/18/17 1011       Joo Madrid MD  12/20/17 0316       Joo Madrid MD  12/20/17 0318

## 2017-12-18 LAB — LAMOTRIGINE SERPL-MCNC: 0.3 UG/ML (ref 2–15)

## 2019-05-28 ENCOUNTER — LAB VISIT (OUTPATIENT)
Dept: LAB | Facility: HOSPITAL | Age: 29
End: 2019-05-28
Payer: COMMERCIAL

## 2019-05-28 ENCOUNTER — CLINICAL SUPPORT (OUTPATIENT)
Dept: INTERNAL MEDICINE | Facility: CLINIC | Age: 29
End: 2019-05-28
Payer: COMMERCIAL

## 2019-05-28 ENCOUNTER — OFFICE VISIT (OUTPATIENT)
Dept: INTERNAL MEDICINE | Facility: CLINIC | Age: 29
End: 2019-05-28
Payer: COMMERCIAL

## 2019-05-28 VITALS
BODY MASS INDEX: 33.31 KG/M2 | HEART RATE: 66 BPM | OXYGEN SATURATION: 99 % | DIASTOLIC BLOOD PRESSURE: 82 MMHG | HEIGHT: 69 IN | TEMPERATURE: 99 F | SYSTOLIC BLOOD PRESSURE: 141 MMHG | WEIGHT: 224.88 LBS

## 2019-05-28 DIAGNOSIS — Z00.00 ANNUAL PHYSICAL EXAM: ICD-10-CM

## 2019-05-28 DIAGNOSIS — Z00.00 ANNUAL PHYSICAL EXAM: Primary | ICD-10-CM

## 2019-05-28 DIAGNOSIS — Z23 NEED FOR TDAP VACCINATION: ICD-10-CM

## 2019-05-28 PROBLEM — F39 MOOD DISORDER: Status: RESOLVED | Noted: 2017-12-15 | Resolved: 2019-05-28

## 2019-05-28 LAB
ALBUMIN SERPL BCP-MCNC: 4.6 G/DL (ref 3.5–5.2)
ALP SERPL-CCNC: 75 U/L (ref 55–135)
ALT SERPL W/O P-5'-P-CCNC: 33 U/L (ref 10–44)
ANION GAP SERPL CALC-SCNC: 10 MMOL/L (ref 8–16)
AST SERPL-CCNC: 22 U/L (ref 10–40)
BASOPHILS # BLD AUTO: 0.02 K/UL (ref 0–0.2)
BASOPHILS NFR BLD: 0.3 % (ref 0–1.9)
BILIRUB SERPL-MCNC: 0.8 MG/DL (ref 0.1–1)
BILIRUB UR QL STRIP: NEGATIVE
BUN SERPL-MCNC: 14 MG/DL (ref 6–20)
CALCIUM SERPL-MCNC: 9.8 MG/DL (ref 8.7–10.5)
CHLORIDE SERPL-SCNC: 101 MMOL/L (ref 95–110)
CHOLEST SERPL-MCNC: 214 MG/DL (ref 120–199)
CHOLEST/HDLC SERPL: 4.3 {RATIO} (ref 2–5)
CLARITY UR REFRACT.AUTO: CLEAR
CO2 SERPL-SCNC: 27 MMOL/L (ref 23–29)
COLOR UR AUTO: NORMAL
CREAT SERPL-MCNC: 1 MG/DL (ref 0.5–1.4)
DIFFERENTIAL METHOD: ABNORMAL
EOSINOPHIL # BLD AUTO: 0.1 K/UL (ref 0–0.5)
EOSINOPHIL NFR BLD: 0.8 % (ref 0–8)
ERYTHROCYTE [DISTWIDTH] IN BLOOD BY AUTOMATED COUNT: 13 % (ref 11.5–14.5)
EST. GFR  (AFRICAN AMERICAN): >60 ML/MIN/1.73 M^2
EST. GFR  (NON AFRICAN AMERICAN): >60 ML/MIN/1.73 M^2
GLUCOSE SERPL-MCNC: 91 MG/DL (ref 70–110)
GLUCOSE UR QL STRIP: NEGATIVE
HCT VFR BLD AUTO: 43.6 % (ref 40–54)
HDLC SERPL-MCNC: 50 MG/DL (ref 40–75)
HDLC SERPL: 23.4 % (ref 20–50)
HGB BLD-MCNC: 14.9 G/DL (ref 14–18)
HGB UR QL STRIP: NEGATIVE
KETONES UR QL STRIP: NEGATIVE
LDLC SERPL CALC-MCNC: 141 MG/DL (ref 63–159)
LEUKOCYTE ESTERASE UR QL STRIP: NEGATIVE
LYMPHOCYTES # BLD AUTO: 2.1 K/UL (ref 1–4.8)
LYMPHOCYTES NFR BLD: 29.1 % (ref 18–48)
MCH RBC QN AUTO: 31.5 PG (ref 27–31)
MCHC RBC AUTO-ENTMCNC: 34.2 G/DL (ref 32–36)
MCV RBC AUTO: 92 FL (ref 82–98)
MONOCYTES # BLD AUTO: 0.5 K/UL (ref 0.3–1)
MONOCYTES NFR BLD: 6.7 % (ref 4–15)
NEUTROPHILS # BLD AUTO: 4.5 K/UL (ref 1.8–7.7)
NEUTROPHILS NFR BLD: 62.4 % (ref 38–73)
NITRITE UR QL STRIP: NEGATIVE
NONHDLC SERPL-MCNC: 164 MG/DL
PH UR STRIP: 6 [PH] (ref 5–8)
PLATELET # BLD AUTO: 230 K/UL (ref 150–350)
PMV BLD AUTO: 11.9 FL (ref 9.2–12.9)
POTASSIUM SERPL-SCNC: 4.2 MMOL/L (ref 3.5–5.1)
PROT SERPL-MCNC: 7.4 G/DL (ref 6–8.4)
PROT UR QL STRIP: NEGATIVE
RBC # BLD AUTO: 4.73 M/UL (ref 4.6–6.2)
SODIUM SERPL-SCNC: 138 MMOL/L (ref 136–145)
SP GR UR STRIP: 1 (ref 1–1.03)
TRIGL SERPL-MCNC: 115 MG/DL (ref 30–150)
URN SPEC COLLECT METH UR: NORMAL
WBC # BLD AUTO: 7.19 K/UL (ref 3.9–12.7)

## 2019-05-28 PROCEDURE — 81003 URINALYSIS AUTO W/O SCOPE: CPT

## 2019-05-28 PROCEDURE — 36415 COLL VENOUS BLD VENIPUNCTURE: CPT

## 2019-05-28 PROCEDURE — 85025 COMPLETE CBC W/AUTO DIFF WBC: CPT

## 2019-05-28 PROCEDURE — 80061 LIPID PANEL: CPT

## 2019-05-28 PROCEDURE — 90715 TDAP VACCINE 7 YRS/> IM: CPT | Mod: S$GLB,,, | Performed by: NURSE PRACTITIONER

## 2019-05-28 PROCEDURE — 99999 PR PBB SHADOW E&M-EST. PATIENT-LVL IV: CPT | Mod: PBBFAC,,, | Performed by: NURSE PRACTITIONER

## 2019-05-28 PROCEDURE — 90471 IMMUNIZATION ADMIN: CPT | Mod: S$GLB,,, | Performed by: NURSE PRACTITIONER

## 2019-05-28 PROCEDURE — 99385 PR PREVENTIVE VISIT,NEW,18-39: ICD-10-PCS | Mod: 25,S$GLB,, | Performed by: NURSE PRACTITIONER

## 2019-05-28 PROCEDURE — 99999 PR PBB SHADOW E&M-EST. PATIENT-LVL IV: ICD-10-PCS | Mod: PBBFAC,,, | Performed by: NURSE PRACTITIONER

## 2019-05-28 PROCEDURE — 99999 PR PBB SHADOW E&M-EST. PATIENT-LVL I: CPT | Mod: PBBFAC,,,

## 2019-05-28 PROCEDURE — 99385 PREV VISIT NEW AGE 18-39: CPT | Mod: 25,S$GLB,, | Performed by: NURSE PRACTITIONER

## 2019-05-28 PROCEDURE — 99999 PR PBB SHADOW E&M-EST. PATIENT-LVL I: ICD-10-PCS | Mod: PBBFAC,,,

## 2019-05-28 PROCEDURE — 90471 TDAP VACCINE GREATER THAN OR EQUAL TO 7YO IM: ICD-10-PCS | Mod: S$GLB,,, | Performed by: NURSE PRACTITIONER

## 2019-05-28 PROCEDURE — 90715 TDAP VACCINE GREATER THAN OR EQUAL TO 7YO IM: ICD-10-PCS | Mod: S$GLB,,, | Performed by: NURSE PRACTITIONER

## 2019-05-28 PROCEDURE — 80053 COMPREHEN METABOLIC PANEL: CPT

## 2019-05-28 NOTE — PROGRESS NOTES
Subjective:       Patient ID: Samuel Mcgraw is a 28 y.o. male.    Chief Complaint: Annual Exam    Disclaimer: This note has been generated using voice-recognition software. There may be typographical errors that have been missed during proof-reading  Patient is clinically is provided patient here for annual exam.  Patient is concerned about his cholesterol levels as he has a paternal uncle and paternal grandfather both had an MI that they did not survive at the age of 49-50.  Patient has no known medical problems states he quit smoking about 2 years ago smoked a total of 6 years about half pack a day drinks about 5-6 drinks a weeks and does use weed about twice a month that he admits to.  Patient states he has been about a year since he went to the eye doctor and went to the dentist a few months ago.  Patient is unsure of his last tetanus vaccine.    Review of Systems   Constitutional: Negative for activity change and unexpected weight change.   HENT: Negative for hearing loss, rhinorrhea and trouble swallowing.    Eyes: Negative for discharge and visual disturbance.   Respiratory: Negative for chest tightness and wheezing.    Cardiovascular: Negative for chest pain and palpitations.   Gastrointestinal: Negative for blood in stool, constipation, diarrhea and vomiting.   Endocrine: Negative for polydipsia and polyuria.   Genitourinary: Negative for difficulty urinating, hematuria and urgency.   Musculoskeletal: Negative for arthralgias, joint swelling and neck pain.   Neurological: Negative for weakness and headaches.   Psychiatric/Behavioral: Negative for confusion and dysphoric mood.         Past Medical History:   Diagnosis Date    Anxiety     History of cold sores     HSV infection     oral    Hx of psychiatric care     Previous medications: Celexa, gabapentin, Abilify    Rupali     elevated mood, increased goal-directed activity, impulsivity, grandiosity, pressured speech    Psychiatric problem      "Therapy     Psychiatrist: Dr. Urbina.      History reviewed. No pertinent surgical history.  Social History     Social History Narrative    Not on file     Family History   Problem Relation Age of Onset    No Known Problems Mother     Hyperlipidemia Father     No Known Problems Sister     No Known Problems Brother     Cancer Maternal Grandfather         melanoma    Hyperlipidemia Paternal Grandfather     Heart disease Paternal Grandfather     Hypertension Paternal Grandfather     Diabetes Neg Hx      Outpatient Encounter Medications as of 5/28/2019   Medication Sig Dispense Refill    valacyclovir (VALTREX) 1000 MG tablet   3    [DISCONTINUED] clonazePAM (KLONOPIN) 2 MG Tab   0    [DISCONTINUED] dextromethorphan-guaifenesin  mg/5 ml (ROBITUSSIN-DM)  mg/5 mL liquid Take 5 mLs by mouth every 4 (four) hours as needed. 236 mL 0    [DISCONTINUED] duloxetine (CYMBALTA) 20 MG capsule   2    [DISCONTINUED] propranolol (INDERAL) 40 MG tablet   5     No facility-administered encounter medications on file as of 5/28/2019.      Last 3 sets of Vitals  Vitals - 1 value per visit 10/26/2016 12/15/2017 5/28/2019   SYSTOLIC 136 139 141   DIASTOLIC 80 65 82   PULSE 77 99 66   TEMPERATURE 98.3 98.3 98.6   RESPIRATIONS - 19 -   SPO2 99 96 99   Weight (lb) 199.74 - 224.87   Weight (kg) 90.6 - 102   HEIGHT 5' 9" - 5' 9"   BODY MASS INDEX 29.5 - 33.21   VISIT REPORT - - -   Pain Score  5 - 0         Objective:      Physical Exam   Constitutional: He is oriented to person, place, and time. Vital signs are normal. He appears well-developed and well-nourished.  Non-toxic appearance. He does not have a sickly appearance. He does not appear ill. No distress.   HENT:   Head: Normocephalic and atraumatic.   Right Ear: Tympanic membrane, external ear and ear canal normal.   Left Ear: Tympanic membrane, external ear and ear canal normal.   Nose: Nose normal. No mucosal edema, rhinorrhea, nasal deformity or septal " deviation. Right sinus exhibits no frontal sinus tenderness. Left sinus exhibits no frontal sinus tenderness.   Mouth/Throat: Oropharynx is clear and moist. No oropharyngeal exudate.   Eyes: Pupils are equal, round, and reactive to light. Conjunctivae, EOM and lids are normal. Right eye exhibits no discharge. Left eye exhibits no discharge. No scleral icterus.   Neck: Trachea normal, normal range of motion and phonation normal. Neck supple. No JVD present.   Cardiovascular: Normal rate, regular rhythm, normal heart sounds and intact distal pulses.   No murmur heard.  Pulmonary/Chest: Effort normal and breath sounds normal. No respiratory distress. He has no wheezes. He has no rales. He exhibits no tenderness.   Abdominal: Soft. Bowel sounds are normal. He exhibits no distension and no mass. There is no tenderness. There is no rebound and no guarding. No hernia.   Musculoskeletal: Normal range of motion. He exhibits no edema or tenderness.   Lymphadenopathy:     He has no cervical adenopathy.   Neurological: He is alert and oriented to person, place, and time. He has normal reflexes. He displays normal reflexes. No cranial nerve deficit. He exhibits normal muscle tone. Coordination normal.   Skin: Skin is warm and dry. Capillary refill takes less than 2 seconds. No rash noted. He is not diaphoretic. No erythema. No pallor.   Psychiatric: He has a normal mood and affect. His behavior is normal. Judgment and thought content normal.   Nursing note and vitals reviewed.          Lab Results   Component Value Date    WBC 5.99 12/15/2017    RBC 4.33 (L) 12/15/2017    HGB 13.9 (L) 12/15/2017    HCT 39.9 (L) 12/15/2017    MCV 92 12/15/2017    MCH 32.1 (H) 12/15/2017    MCHC 34.8 12/15/2017    RDW 12.9 12/15/2017     12/15/2017    MPV 10.6 12/15/2017    GRAN 3.6 12/15/2017    GRAN 59.6 12/15/2017    LYMPH 1.8 12/15/2017    LYMPH 30.4 12/15/2017    MONO 0.5 12/15/2017    MONO 8.8 12/15/2017    EOS 0.0 12/15/2017    MAYAO  0.01 12/15/2017    EOSINOPHIL 0.3 12/15/2017    BASOPHIL 0.2 12/15/2017     Lab Results   Component Value Date    WBC 5.99 12/15/2017    HGB 13.9 (L) 12/15/2017    HCT 39.9 (L) 12/15/2017     12/15/2017    CHOL 225 (H) 11/24/2015    TRIG 162 (H) 11/24/2015    HDL 45 11/24/2015    ALT 35 12/15/2017    AST 23 12/15/2017     12/15/2017    K 4.2 12/15/2017     12/15/2017    CREATININE 1.1 12/15/2017    BUN 15 12/15/2017    CO2 29 12/15/2017    TSH 1.234 12/15/2017       Assessment:       1. Annual physical exam    2. Need for Tdap vaccination        Plan:           Samuel was seen today for annual exam.    Diagnoses and all orders for this visit:    Annual physical exam  -     CBC auto differential; Future  -     Comprehensive metabolic panel; Future  -     Lipid panel; Future  -     Urinalysis    Need for Tdap vaccination  Comments:  pt to get at pharmacy        Patient Instructions       Prevention Guidelines, Men Ages 18 to 39  Screening tests and vaccines are an important part of managing your health. Health counseling is essential, too. Below are guidelines for these, for men ages 18 to 39. Talk with your healthcare provider to make sure youre up-to-date on what you need.  Screening Who needs it How often   Alcohol misuse All men in this age group At routine exams   Blood pressure All men in this age group Every 2 years if your blood pressure is less than 120/80 mm Hg; yearly if your systolic blood pressure is 120 to 139 mm Hg, or your diastolic blood pressure reading is 80 to 89 mm Hg   Depression All men in this age group At routine exams   Diabetes mellitus, type 2 Adults who have no symptoms but are overweight or obese and have 1 or more other risk factors for diabetes At least every 3 years (yearly if blood sugar has already started to rise)   Hepatitis C If at increased risk At routine exams   High cholesterol or triglycerides All men ages 35 and older, and younger men at high risk for  coronary artery disease At least every 5 years   HIV All men At routine exams   Obesity All men in this age group At routine exams   Syphilis Men at increased risk for infection - talk with your healthcare provider At routine exams   Tuberculosis Men at increased risk for infection - talk with your healthcare provider Check with your healthcare provider   Vision All men in this age group Every 5 to 10 years if no risk factors for eye disease   Vaccines Who needs it How often   Chickenpox (varicella) All men in this age group who have no record of this infection or vaccine 2 doses; the second dose should be given at least 4 weeks after the first dose   Hepatitis A Men at increased risk for infection - talk with your healthcare provider 2 doses given at least 6 months apart   Hepatitis B Men at increased risk for infection - talk with your healthcare provider 3 doses over 6 months; second dose should be given 1 month after the first dose; the third dose should be given at least 2 months after the second dose and at least 4 months after the first dose   Haemophilus influenzae Type B (HIB) Men at increased risk for infection - talk with your healthcare provider 1 to 3 doses   Human papillomavirus (HPV) All men in this age group up to age 26 3 doses; the second dose should be given 1 to 2 months after the first dose and the third dose given 6 months after the first dose   Influenza (flu) All men in this age group Once a year   Measles, mumps, rubella (MMR) All men in this age group who have no record of these infections or vaccines 1 or 2 doses through age 55   Meningococcal Men at increased risk for infection - talk with your healthcare provider 1 or more doses   Pneumococca (PCV13) and Pneumococcal (PPSV23) Men at increased risk for infection - talk with your healthcare provider PCV13: 1 dose ages 19 to 65 (protects against 13 types of pneumococcal bacteria)  PPSV23: 1 to 2 doses through age 64, or 1 dose at 65 or  older (protects against 23 types of pneumococcal bacteria)   Tetanus/diphtheria/pertussis (Td/Tdap) booster All men in this age group A one-time Tdap booster after age 18, then Td every10 years   Counseling Who needs it How often   Diet and exercise Overweight or obese people When diagnosed, and then at routine exams   Use of tobacco and the health effects it can cause All men in this age group Every visit   Sexually transmitted infection prevention Men who are sexually active At routine exams   Skin cancer Prevention of skin cancer in fair-skinned adults through age 24 At routine exams   1Those who are 18 years of age, who are not up-to-date on their childhood immunizations, should receive all appropriate catch-up vaccines recommended by the CDC.  Date Last Reviewed: 2/1/2017  © 9159-8081 [employer]. [address] [place] [address], [place] [address]. All rights reserved. This information is not intended as a substitute for professional medical care. Always follow your healthcare professional's instructions.

## 2019-05-28 NOTE — PATIENT INSTRUCTIONS
Prevention Guidelines, Men Ages 18 to 39  Screening tests and vaccines are an important part of managing your health. Health counseling is essential, too. Below are guidelines for these, for men ages 18 to 39. Talk with your healthcare provider to make sure youre up-to-date on what you need.  Screening Who needs it How often   Alcohol misuse All men in this age group At routine exams   Blood pressure All men in this age group Every 2 years if your blood pressure is less than 120/80 mm Hg; yearly if your systolic blood pressure is 120 to 139 mm Hg, or your diastolic blood pressure reading is 80 to 89 mm Hg   Depression All men in this age group At routine exams   Diabetes mellitus, type 2 Adults who have no symptoms but are overweight or obese and have 1 or more other risk factors for diabetes At least every 3 years (yearly if blood sugar has already started to rise)   Hepatitis C If at increased risk At routine exams   High cholesterol or triglycerides All men ages 35 and older, and younger men at high risk for coronary artery disease At least every 5 years   HIV All men At routine exams   Obesity All men in this age group At routine exams   Syphilis Men at increased risk for infection - talk with your healthcare provider At routine exams   Tuberculosis Men at increased risk for infection - talk with your healthcare provider Check with your healthcare provider   Vision All men in this age group Every 5 to 10 years if no risk factors for eye disease   Vaccines Who needs it How often   Chickenpox (varicella) All men in this age group who have no record of this infection or vaccine 2 doses; the second dose should be given at least 4 weeks after the first dose   Hepatitis A Men at increased risk for infection - talk with your healthcare provider 2 doses given at least 6 months apart   Hepatitis B Men at increased risk for infection - talk with your healthcare provider 3 doses over 6 months; second dose should be  given 1 month after the first dose; the third dose should be given at least 2 months after the second dose and at least 4 months after the first dose   Haemophilus influenzae Type B (HIB) Men at increased risk for infection - talk with your healthcare provider 1 to 3 doses   Human papillomavirus (HPV) All men in this age group up to age 26 3 doses; the second dose should be given 1 to 2 months after the first dose and the third dose given 6 months after the first dose   Influenza (flu) All men in this age group Once a year   Measles, mumps, rubella (MMR) All men in this age group who have no record of these infections or vaccines 1 or 2 doses through age 55   Meningococcal Men at increased risk for infection - talk with your healthcare provider 1 or more doses   Pneumococca (PCV13) and Pneumococcal (PPSV23) Men at increased risk for infection - talk with your healthcare provider PCV13: 1 dose ages 19 to 65 (protects against 13 types of pneumococcal bacteria)  PPSV23: 1 to 2 doses through age 64, or 1 dose at 65 or older (protects against 23 types of pneumococcal bacteria)   Tetanus/diphtheria/pertussis (Td/Tdap) booster All men in this age group A one-time Tdap booster after age 18, then Td every10 years   Counseling Who needs it How often   Diet and exercise Overweight or obese people When diagnosed, and then at routine exams   Use of tobacco and the health effects it can cause All men in this age group Every visit   Sexually transmitted infection prevention Men who are sexually active At routine exams   Skin cancer Prevention of skin cancer in fair-skinned adults through age 24 At routine exams   1Those who are 18 years of age, who are not up-to-date on their childhood immunizations, should receive all appropriate catch-up vaccines recommended by the CDC.  Date Last Reviewed: 2/1/2017  © 1377-0968 [employer]. [address] [place] [address], [place] [address]. All rights reserved. This information is not  intended as a substitute for professional medical care. Always follow your healthcare professional's instructions.

## 2019-05-30 ENCOUNTER — TELEPHONE (OUTPATIENT)
Dept: INTERNAL MEDICINE | Facility: CLINIC | Age: 29
End: 2019-05-30

## 2019-05-30 ENCOUNTER — PATIENT MESSAGE (OUTPATIENT)
Dept: INTERNAL MEDICINE | Facility: CLINIC | Age: 29
End: 2019-05-30

## 2019-05-30 NOTE — TELEPHONE ENCOUNTER
Lab Results   Component Value Date    CHOL 214 (H) 05/28/2019    CHOL 225 (H) 11/24/2015    CHOL 202 (H) 10/02/2014     Lab Results   Component Value Date    HDL 50 05/28/2019    HDL 45 11/24/2015    HDL 52 10/02/2014     Lab Results   Component Value Date    LDLCALC 141.0 05/28/2019    LDLCALC 147.6 11/24/2015    LDLCALC 129.6 10/02/2014     Lab Results   Component Value Date    TRIG 115 05/28/2019    TRIG 162 (H) 11/24/2015    TRIG 102 10/02/2014     Lab Results   Component Value Date    CHOLHDL 23.4 05/28/2019    CHOLHDL 20.0 11/24/2015    CHOLHDL 25.7 10/02/2014   ` Improving; have messaged in regards to continued monitoring with diet and exercise; would not commence to lipid lowering agent at this time.         CMP  Sodium   Date Value Ref Range Status   05/28/2019 138 136 - 145 mmol/L Final     Potassium   Date Value Ref Range Status   05/28/2019 4.2 3.5 - 5.1 mmol/L Final     Chloride   Date Value Ref Range Status   05/28/2019 101 95 - 110 mmol/L Final     CO2   Date Value Ref Range Status   05/28/2019 27 23 - 29 mmol/L Final     Glucose   Date Value Ref Range Status   05/28/2019 91 70 - 110 mg/dL Final     BUN, Bld   Date Value Ref Range Status   05/28/2019 14 6 - 20 mg/dL Final     Creatinine   Date Value Ref Range Status   05/28/2019 1.0 0.5 - 1.4 mg/dL Final     Calcium   Date Value Ref Range Status   05/28/2019 9.8 8.7 - 10.5 mg/dL Final     Total Protein   Date Value Ref Range Status   05/28/2019 7.4 6.0 - 8.4 g/dL Final     Albumin   Date Value Ref Range Status   05/28/2019 4.6 3.5 - 5.2 g/dL Final     Total Bilirubin   Date Value Ref Range Status   05/28/2019 0.8 0.1 - 1.0 mg/dL Final     Comment:     For infants and newborns, interpretation of results should be based  on gestational age, weight and in agreement with clinical  observations.  Premature Infant recommended reference ranges:  Up to 24 hours.............<8.0 mg/dL  Up to 48 hours............<12.0 mg/dL  3-5 days..................<15.0  mg/dL  6-29 days.................<15.0 mg/dL       Alkaline Phosphatase   Date Value Ref Range Status   05/28/2019 75 55 - 135 U/L Final     AST   Date Value Ref Range Status   05/28/2019 22 10 - 40 U/L Final     ALT   Date Value Ref Range Status   05/28/2019 33 10 - 44 U/L Final     Anion Gap   Date Value Ref Range Status   05/28/2019 10 8 - 16 mmol/L Final     eGFR if    Date Value Ref Range Status   05/28/2019 >60 >60 mL/min/1.73 m^2 Final     eGFR if non    Date Value Ref Range Status   05/28/2019 >60 >60 mL/min/1.73 m^2 Final     Comment:     Calculation used to obtain the estimated glomerular filtration  rate (eGFR) is the CKD-EPI equation.        Results released to portal.

## 2020-06-12 ENCOUNTER — OFFICE VISIT (OUTPATIENT)
Dept: URGENT CARE | Facility: CLINIC | Age: 30
End: 2020-06-12
Payer: COMMERCIAL

## 2020-06-12 VITALS
HEIGHT: 70 IN | TEMPERATURE: 98 F | SYSTOLIC BLOOD PRESSURE: 157 MMHG | OXYGEN SATURATION: 98 % | WEIGHT: 205 LBS | DIASTOLIC BLOOD PRESSURE: 96 MMHG | HEART RATE: 91 BPM | BODY MASS INDEX: 29.35 KG/M2

## 2020-06-12 DIAGNOSIS — Z03.818 ENCOUNTER FOR OBSERVATION FOR SUSPECTED EXPOSURE TO OTHER BIOLOGICAL AGENTS RULED OUT: ICD-10-CM

## 2020-06-12 PROCEDURE — U0003 INFECTIOUS AGENT DETECTION BY NUCLEIC ACID (DNA OR RNA); SEVERE ACUTE RESPIRATORY SYNDROME CORONAVIRUS 2 (SARS-COV-2) (CORONAVIRUS DISEASE [COVID-19]), AMPLIFIED PROBE TECHNIQUE, MAKING USE OF HIGH THROUGHPUT TECHNOLOGIES AS DESCRIBED BY CMS-2020-01-R: HCPCS

## 2020-06-12 PROCEDURE — 99211 PR OFFICE/OUTPT VISIT, EST, LEVL I: ICD-10-PCS | Mod: S$GLB,,, | Performed by: FAMILY MEDICINE

## 2020-06-12 PROCEDURE — 99211 OFF/OP EST MAY X REQ PHY/QHP: CPT | Mod: S$GLB,,, | Performed by: FAMILY MEDICINE

## 2020-06-12 NOTE — PROGRESS NOTES
"Subjective:       Patient ID: Samuel Mcgraw is a 30 y.o. male.    Vitals:  height is 5' 9.5" (1.765 m) and weight is 93 kg (205 lb). His tympanic temperature is 97.6 °F (36.4 °C). His blood pressure is 157/96 (abnormal) and his pulse is 91. His oxygen saturation is 98%.     Chief Complaint: COVID-19 Concerns    Patient presents with no symptoms of covid but was exposure to it by his coworkers who tested positive. patient is requesting to get tested for covid-19.  Counseled regarding different testing available and educated regarding implication of positive/negative results of each.    Other   This is a new problem. The current episode started today. The problem occurs constantly. The problem has been unchanged. Pertinent negatives include no arthralgias, chest pain, chills, congestion, coughing, fatigue, fever, headaches, joint swelling, myalgias, nausea, rash, sore throat, vertigo or vomiting. Nothing aggravates the symptoms. He has tried nothing for the symptoms. The treatment provided no relief.       Constitution: Negative for chills, fatigue and fever.   HENT: Negative for congestion and sore throat.    Neck: Negative for painful lymph nodes.   Cardiovascular: Negative for chest pain and leg swelling.   Eyes: Negative for double vision and blurred vision.   Respiratory: Negative for cough and shortness of breath.    Gastrointestinal: Negative for nausea, vomiting and diarrhea.   Genitourinary: Negative for dysuria, frequency and urgency.   Musculoskeletal: Negative for joint pain, joint swelling, muscle cramps and muscle ache.   Skin: Negative for color change, pale and rash.   Allergic/Immunologic: Negative for seasonal allergies.   Neurological: Negative for dizziness, history of vertigo, light-headedness, passing out and headaches.   Hematologic/Lymphatic: Negative for swollen lymph nodes, easy bruising/bleeding and history of blood clots. Does not bruise/bleed easily.   Psychiatric/Behavioral: Negative " for nervous/anxious, sleep disturbance and depression. The patient is not nervous/anxious.        Objective:      Physical Exam   Constitutional: He is oriented to person, place, and time. He appears well-developed and well-nourished. No distress.   HENT:   Head: Normocephalic and atraumatic.   Pulmonary/Chest: Effort normal. No respiratory distress.   Neurological: He is alert and oriented to person, place, and time.   Skin: Skin is not diaphoretic.   Psychiatric: He has a normal mood and affect. His behavior is normal.   Vitals reviewed.        Assessment:       1. Encounter for observation for suspected exposure to other biological agents ruled out        Plan:         Encounter for observation for suspected exposure to other biological agents ruled out  -     COVID-19 Routine Screening    WE WILL CALL YOU IN 1-2 DAYS WITH RESULT OF TEST DONE TODAY.

## 2020-06-12 NOTE — PATIENT INSTRUCTIONS
Guidelines for General Prevention of COVID-19    o Take steps to protect yourself from COVID-19. Perform hand hygiene frequently. Wash your hands often with soap and water for at least 20 seconds of use and alcohol-based hand , covering all surfaces of your hands and rubbing them together until they feel dry.  o Avoid touching your eyes, nose, and mouth with unwashed hands.  o Avoid close contact with people and stay home if youre sick, except to get medical care.   o Cover coughs and sneezes with a tissue, or use the inside of your elbow. Immediately wash your hands or use hand .     For more information, see CDC link below:    https://www.cdc.gov/coronavirus/2019-ncov/hcp/guidance-prevent-spread.html#precautions      WE WILL CALL YOU IN 1-2 DAYS WITH RESULT OF TEST DONE TODAY.

## 2020-06-15 ENCOUNTER — TELEPHONE (OUTPATIENT)
Dept: URGENT CARE | Facility: CLINIC | Age: 30
End: 2020-06-15

## 2020-06-15 LAB — SARS-COV-2 RNA RESP QL NAA+PROBE: NOT DETECTED

## 2020-06-15 NOTE — TELEPHONE ENCOUNTER
I spoke with patient and reported result of negative COVID nasal swab.  All questions answered.        Left message requesting call back.

## 2020-06-25 ENCOUNTER — OFFICE VISIT (OUTPATIENT)
Dept: INTERNAL MEDICINE | Facility: CLINIC | Age: 30
End: 2020-06-25
Payer: COMMERCIAL

## 2020-06-25 ENCOUNTER — LAB VISIT (OUTPATIENT)
Dept: LAB | Facility: HOSPITAL | Age: 30
End: 2020-06-25
Payer: COMMERCIAL

## 2020-06-25 VITALS
DIASTOLIC BLOOD PRESSURE: 85 MMHG | HEART RATE: 68 BPM | HEIGHT: 70 IN | SYSTOLIC BLOOD PRESSURE: 125 MMHG | WEIGHT: 201.75 LBS | OXYGEN SATURATION: 99 % | BODY MASS INDEX: 28.88 KG/M2

## 2020-06-25 DIAGNOSIS — Z01.89 ROUTINE LAB DRAW: ICD-10-CM

## 2020-06-25 DIAGNOSIS — E66.3 OVERWEIGHT (BMI 25.0-29.9): ICD-10-CM

## 2020-06-25 DIAGNOSIS — Z00.00 ENCOUNTER FOR HEALTH MAINTENANCE EXAMINATION: Primary | ICD-10-CM

## 2020-06-25 DIAGNOSIS — Z11.4 ENCOUNTER FOR SCREENING FOR HIV: ICD-10-CM

## 2020-06-25 DIAGNOSIS — Z76.89 ENCOUNTER TO ESTABLISH CARE WITH NEW DOCTOR: ICD-10-CM

## 2020-06-25 DIAGNOSIS — Z13.220 SCREENING CHOLESTEROL LEVEL: ICD-10-CM

## 2020-06-25 LAB
BACTERIA #/AREA URNS AUTO: NORMAL /HPF
BILIRUB UR QL STRIP: NEGATIVE
CLARITY UR REFRACT.AUTO: ABNORMAL
COLOR UR AUTO: YELLOW
GLUCOSE UR QL STRIP: NEGATIVE
HGB UR QL STRIP: NEGATIVE
KETONES UR QL STRIP: NEGATIVE
LEUKOCYTE ESTERASE UR QL STRIP: NEGATIVE
MICROSCOPIC COMMENT: NORMAL
NITRITE UR QL STRIP: NEGATIVE
PH UR STRIP: 6 [PH] (ref 5–8)
PROT UR QL STRIP: NEGATIVE
SP GR UR STRIP: 1.01 (ref 1–1.03)
URN SPEC COLLECT METH UR: ABNORMAL
WBC #/AREA URNS AUTO: 0 /HPF (ref 0–5)

## 2020-06-25 PROCEDURE — 99395 PREV VISIT EST AGE 18-39: CPT | Mod: S$GLB,,, | Performed by: NURSE PRACTITIONER

## 2020-06-25 PROCEDURE — 99395 PR PREVENTIVE VISIT,EST,18-39: ICD-10-PCS | Mod: S$GLB,,, | Performed by: NURSE PRACTITIONER

## 2020-06-25 PROCEDURE — 99999 PR PBB SHADOW E&M-EST. PATIENT-LVL IV: CPT | Mod: PBBFAC,,, | Performed by: NURSE PRACTITIONER

## 2020-06-25 PROCEDURE — 99999 PR PBB SHADOW E&M-EST. PATIENT-LVL IV: ICD-10-PCS | Mod: PBBFAC,,, | Performed by: NURSE PRACTITIONER

## 2020-06-25 PROCEDURE — 81001 URINALYSIS AUTO W/SCOPE: CPT

## 2020-06-25 RX ORDER — DOXYCYCLINE 100 MG/1
CAPSULE ORAL
COMMUNITY
Start: 2020-06-08 | End: 2020-06-25

## 2020-06-25 NOTE — PROGRESS NOTES
Subjective:       Patient ID: Samuel Mcgraw is a 30 y.o. male.    Chief Complaint: Blood Pressure Check    Pt new to me, here for annual to establish care and evaluation of BP. No complaints.    Noted he recently was seen in  for COVID testing, test was negative, but his BP was elevated at 157/96.    I have reviewed the HPI and ROS info pt entered in portal prior to visit today below      Hypertension  This is a recurrent problem. The problem is unchanged. The problem is resistant. Pertinent negatives include no anxiety, blurred vision, chest pain, headaches, malaise/fatigue, neck pain, orthopnea, palpitations, peripheral edema, PND, shortness of breath or sweats. There are no associated agents to hypertension. Risk factors for coronary artery disease include family history and stress. Past treatments include nothing. There are no compliance problems.      Review of Systems   Constitutional: Negative for activity change, appetite change, malaise/fatigue and unexpected weight change.   HENT: Negative for hearing loss and voice change.    Eyes: Negative for blurred vision and visual disturbance.   Respiratory: Negative for apnea, cough, chest tightness and shortness of breath.    Cardiovascular: Negative for chest pain, palpitations, orthopnea, leg swelling and PND.   Gastrointestinal: Negative for abdominal distention, abdominal pain, blood in stool, constipation, diarrhea, nausea and vomiting.   Endocrine: Negative for cold intolerance, heat intolerance, polydipsia, polyphagia and polyuria.   Genitourinary: Negative for difficulty urinating, dysuria and penile pain.   Musculoskeletal: Negative for arthralgias, myalgias and neck pain.   Integumentary:  Negative for color change, pallor, rash and wound.   Allergic/Immunologic: Negative for environmental allergies and food allergies.   Neurological: Negative for dizziness, weakness, light-headedness and headaches.   Hematological: Negative for adenopathy. Does not  bruise/bleed easily.   Psychiatric/Behavioral: Negative for agitation, behavioral problems, sleep disturbance and suicidal ideas.     Review of patient's allergies indicates:  No Known Allergies      Current Outpatient Medications:     valacyclovir (VALTREX) 1000 MG tablet, , Disp: , Rfl: 3    Patient Active Problem List   Diagnosis   (none) - all problems resolved or deleted     Past Medical History:   Diagnosis Date    Anxiety     History of cold sores     HSV infection     oral    Hx of psychiatric care     Previous medications: Celexa, gabapentin, Abilify    Rupali     elevated mood, increased goal-directed activity, impulsivity, grandiosity, pressured speech    Psychiatric problem     Therapy     Psychiatrist: Dr. Urbina.      History reviewed. No pertinent surgical history.    Social History     Socioeconomic History    Marital status:      Spouse name: Not on file    Number of children: Not on file    Years of education: Not on file    Highest education level: Not on file   Occupational History    Occupation: Finance and Operations Management     Employer: [employer]     Comment: [employer]   Social Needs    Financial resource strain: Not hard at all    Food insecurity     Worry: Never true     Inability: Never true    Transportation needs     Medical: No     Non-medical: No   Tobacco Use    Smoking status: Former Smoker     Packs/day: 0.25     Years: 2.50     Pack years: 0.62     Quit date: 2014     Years since quittin.9    Smokeless tobacco: Never Used   Substance and Sexual Activity    Alcohol use: Yes     Frequency: Monthly or less     Drinks per session: 1 or 2     Binge frequency: Less than monthly     Comment: socially    Drug use: Yes     Comment: Vapes THC oil X1 month     Sexual activity: Yes     Partners: Female     Birth control/protection: OCP   Lifestyle    Physical activity     Days per week: 5 days     Minutes per session: 50 min    Stress: Rather much  "  Relationships    Social connections     Talks on phone: More than three times a week     Gets together: Once a week     Attends [employer] service: Not on file     Active member of club or organization: No     Attends meetings of clubs or organizations: Never     Relationship status:    Other Topics Concern    Patient feels they ought to cut down on drinking/drug use Not Asked    Patient annoyed by others criticizing their drinking/drug use Not Asked    Patient has felt bad or guilty about drinking/drug use Not Asked    Patient has had a drink/used drugs as an eye opener in the AM Not Asked   Social History Narrative    Not on file     Family History   Problem Relation Age of Onset    No Known Problems Mother     Hyperlipidemia Father     No Known Problems Sister     No Known Problems Brother     Cancer Maternal Grandfather         melanoma    Hyperlipidemia Paternal Grandfather     Heart disease Paternal Grandfather     Hypertension Paternal Grandfather     Diabetes Neg Hx            Objective:       Vitals:    06/25/20 1324   BP: 125/85   Pulse: 68   SpO2: 99%   Weight: 91.5 kg (201 lb 11.5 oz)   Height: 5' 9.5" (1.765 m)   PainSc: 0-No pain       Body mass index is 29.36 kg/m².    Physical Exam  Vitals signs and nursing note reviewed.   Constitutional:       Appearance: Normal appearance. He is well-developed.      Comments: overweight   HENT:      Head: Normocephalic.      Right Ear: Tympanic membrane, ear canal and external ear normal.      Left Ear: Tympanic membrane, ear canal and external ear normal.      Nose: Nose normal.      Mouth/Throat:      Mouth: Mucous membranes are moist.      Pharynx: Oropharynx is clear.   Eyes:      General: Lids are normal. Lids are everted, no foreign bodies appreciated.      Conjunctiva/sclera: Conjunctivae normal.      Pupils: Pupils are equal, round, and reactive to light.   Neck:      Musculoskeletal: Full passive range of motion without pain, normal " range of motion and neck supple.      Vascular: No carotid bruit or JVD.      Trachea: Trachea normal.   Cardiovascular:      Rate and Rhythm: Normal rate and regular rhythm.      Pulses: Normal pulses.      Heart sounds: Normal heart sounds.   Pulmonary:      Effort: Pulmonary effort is normal.      Breath sounds: Normal breath sounds.   Abdominal:      General: Bowel sounds are normal.      Palpations: Abdomen is soft.   Musculoskeletal: Normal range of motion.   Skin:     General: Skin is warm and dry.      Capillary Refill: Capillary refill takes less than 2 seconds.   Neurological:      General: No focal deficit present.      Mental Status: He is alert and oriented to person, place, and time.   Psychiatric:         Mood and Affect: Mood normal.         Speech: Speech normal.         Behavior: Behavior normal.         Thought Content: Thought content normal.         Judgment: Judgment normal.         Assessment:       1. Encounter for health maintenance examination    2. Encounter to establish care with new doctor    3. BMI 29.0-29.9,adult    4. Overweight (BMI 25.0-29.9)    5. Screening cholesterol level    6. Routine lab draw    7. Encounter for screening for HIV        Plan:       Samuel was seen today for blood pressure check.    Diagnoses and all orders for this visit:    Encounter for health maintenance examination  Annual wellness exam completed.    All medications, histories, and concerns reviewed, reconciled, and addressed.    Appropriate Screenings per pt's sex and age have been reviewed and discussed with pt.    BMI reviewed.    Encounter to establish care with new doctor  -     Ambulatory referral/consult to Internal Medicine; Future    BMI 29.0-29.9,adult  BMI reviewed    Overweight (BMI 25.0-29.9)  BMI reviewed.    Diet and exercise to lose weight.    Screening cholesterol level  -     Lipid Panel; Future    Routine lab draw  -     CBC auto differential; Future  -     Comprehensive metabolic panel;  Future  -     Lipid Panel; Future  -     HIV 1/2 Ag/Ab (4th Gen); Future  -     TSH; Future  -     Urinalysis; Future    Encounter for screening for HIV  -     HIV 1/2 Ag/Ab (4th Gen); Future    Fasting labs ordered and drawn today, will call with results, if results ok, RTC in 1 yr for annual or sooner prn with one of MDs I work with who can be your new PCP: Dr. Josey Barber, Dr. Milo Varghese, Dr. Tangela Hu, Dr. Tarun Ashton, Dr. Abiodun Gamez, or Dr. Ernesto Forbes    Follow up in about 1 year (around 6/25/2021) for for annual or sooner as needed with one of MDs recommended on AVS.

## 2021-03-16 ENCOUNTER — IMMUNIZATION (OUTPATIENT)
Dept: OBSTETRICS AND GYNECOLOGY | Facility: CLINIC | Age: 31
End: 2021-03-16
Payer: COMMERCIAL

## 2021-03-16 DIAGNOSIS — Z23 NEED FOR VACCINATION: Primary | ICD-10-CM

## 2021-03-16 PROCEDURE — 91300 COVID-19, MRNA, LNP-S, PF, 30 MCG/0.3 ML DOSE VACCINE: CPT | Mod: PBBFAC

## 2021-04-08 ENCOUNTER — IMMUNIZATION (OUTPATIENT)
Dept: OBSTETRICS AND GYNECOLOGY | Facility: CLINIC | Age: 31
End: 2021-04-08
Payer: COMMERCIAL

## 2021-04-08 DIAGNOSIS — Z23 NEED FOR VACCINATION: Primary | ICD-10-CM

## 2021-04-08 PROCEDURE — 0002A COVID-19, MRNA, LNP-S, PF, 30 MCG/0.3 ML DOSE VACCINE: CPT | Mod: CV19,,, | Performed by: FAMILY MEDICINE

## 2021-04-08 PROCEDURE — 91300 COVID-19, MRNA, LNP-S, PF, 30 MCG/0.3 ML DOSE VACCINE: ICD-10-PCS | Mod: ,,, | Performed by: FAMILY MEDICINE

## 2021-04-08 PROCEDURE — 0002A COVID-19, MRNA, LNP-S, PF, 30 MCG/0.3 ML DOSE VACCINE: ICD-10-PCS | Mod: CV19,,, | Performed by: FAMILY MEDICINE

## 2021-04-08 PROCEDURE — 91300 COVID-19, MRNA, LNP-S, PF, 30 MCG/0.3 ML DOSE VACCINE: CPT | Mod: ,,, | Performed by: FAMILY MEDICINE

## 2021-06-10 ENCOUNTER — OFFICE VISIT (OUTPATIENT)
Dept: INTERNAL MEDICINE | Facility: CLINIC | Age: 31
End: 2021-06-10
Payer: COMMERCIAL

## 2021-06-10 ENCOUNTER — LAB VISIT (OUTPATIENT)
Dept: LAB | Facility: HOSPITAL | Age: 31
End: 2021-06-10
Attending: INTERNAL MEDICINE
Payer: COMMERCIAL

## 2021-06-10 VITALS
HEART RATE: 73 BPM | BODY MASS INDEX: 29.14 KG/M2 | WEIGHT: 196.75 LBS | HEIGHT: 69 IN | OXYGEN SATURATION: 98 % | SYSTOLIC BLOOD PRESSURE: 136 MMHG | DIASTOLIC BLOOD PRESSURE: 84 MMHG

## 2021-06-10 DIAGNOSIS — Z82.49 FAMILY HISTORY OF HEART DISEASE: ICD-10-CM

## 2021-06-10 DIAGNOSIS — Z00.00 ANNUAL PHYSICAL EXAM: ICD-10-CM

## 2021-06-10 DIAGNOSIS — Z86.19 H/O COLD SORES: ICD-10-CM

## 2021-06-10 DIAGNOSIS — Z00.00 ANNUAL PHYSICAL EXAM: Primary | ICD-10-CM

## 2021-06-10 LAB
ALBUMIN SERPL BCP-MCNC: 4.4 G/DL (ref 3.5–5.2)
ALP SERPL-CCNC: 76 U/L (ref 55–135)
ALT SERPL W/O P-5'-P-CCNC: 97 U/L (ref 10–44)
ANION GAP SERPL CALC-SCNC: 12 MMOL/L (ref 8–16)
AST SERPL-CCNC: 41 U/L (ref 10–40)
BASOPHILS # BLD AUTO: 0.03 K/UL (ref 0–0.2)
BASOPHILS NFR BLD: 0.5 % (ref 0–1.9)
BILIRUB SERPL-MCNC: 1.3 MG/DL (ref 0.1–1)
BUN SERPL-MCNC: 13 MG/DL (ref 6–20)
CALCIUM SERPL-MCNC: 10 MG/DL (ref 8.7–10.5)
CHLORIDE SERPL-SCNC: 103 MMOL/L (ref 95–110)
CHOLEST SERPL-MCNC: 217 MG/DL (ref 120–199)
CHOLEST/HDLC SERPL: 4.3 {RATIO} (ref 2–5)
CO2 SERPL-SCNC: 23 MMOL/L (ref 23–29)
CREAT SERPL-MCNC: 1 MG/DL (ref 0.5–1.4)
DIFFERENTIAL METHOD: NORMAL
EOSINOPHIL # BLD AUTO: 0.1 K/UL (ref 0–0.5)
EOSINOPHIL NFR BLD: 1.3 % (ref 0–8)
ERYTHROCYTE [DISTWIDTH] IN BLOOD BY AUTOMATED COUNT: 13.3 % (ref 11.5–14.5)
EST. GFR  (AFRICAN AMERICAN): >60 ML/MIN/1.73 M^2
EST. GFR  (NON AFRICAN AMERICAN): >60 ML/MIN/1.73 M^2
ESTIMATED AVG GLUCOSE: 97 MG/DL (ref 68–131)
GLUCOSE SERPL-MCNC: 80 MG/DL (ref 70–110)
HBA1C MFR BLD: 5 % (ref 4–5.6)
HCT VFR BLD AUTO: 43.5 % (ref 40–54)
HDLC SERPL-MCNC: 51 MG/DL (ref 40–75)
HDLC SERPL: 23.5 % (ref 20–50)
HGB BLD-MCNC: 15.1 G/DL (ref 14–18)
IMM GRANULOCYTES # BLD AUTO: 0.03 K/UL (ref 0–0.04)
IMM GRANULOCYTES NFR BLD AUTO: 0.5 % (ref 0–0.5)
LDLC SERPL CALC-MCNC: 140.2 MG/DL (ref 63–159)
LYMPHOCYTES # BLD AUTO: 2.5 K/UL (ref 1–4.8)
LYMPHOCYTES NFR BLD: 40.6 % (ref 18–48)
MCH RBC QN AUTO: 30.8 PG (ref 27–31)
MCHC RBC AUTO-ENTMCNC: 34.7 G/DL (ref 32–36)
MCV RBC AUTO: 89 FL (ref 82–98)
MONOCYTES # BLD AUTO: 0.5 K/UL (ref 0.3–1)
MONOCYTES NFR BLD: 8.3 % (ref 4–15)
NEUTROPHILS # BLD AUTO: 3 K/UL (ref 1.8–7.7)
NEUTROPHILS NFR BLD: 48.8 % (ref 38–73)
NONHDLC SERPL-MCNC: 166 MG/DL
NRBC BLD-RTO: 0 /100 WBC
PLATELET # BLD AUTO: 252 K/UL (ref 150–450)
PMV BLD AUTO: 10.9 FL (ref 9.2–12.9)
POTASSIUM SERPL-SCNC: 4.1 MMOL/L (ref 3.5–5.1)
PROT SERPL-MCNC: 7.5 G/DL (ref 6–8.4)
RBC # BLD AUTO: 4.91 M/UL (ref 4.6–6.2)
SODIUM SERPL-SCNC: 138 MMOL/L (ref 136–145)
TRIGL SERPL-MCNC: 129 MG/DL (ref 30–150)
WBC # BLD AUTO: 6.13 K/UL (ref 3.9–12.7)

## 2021-06-10 PROCEDURE — 3008F PR BODY MASS INDEX (BMI) DOCUMENTED: ICD-10-PCS | Mod: CPTII,S$GLB,, | Performed by: INTERNAL MEDICINE

## 2021-06-10 PROCEDURE — 85025 COMPLETE CBC W/AUTO DIFF WBC: CPT | Performed by: INTERNAL MEDICINE

## 2021-06-10 PROCEDURE — 99999 PR PBB SHADOW E&M-EST. PATIENT-LVL III: CPT | Mod: PBBFAC,,, | Performed by: INTERNAL MEDICINE

## 2021-06-10 PROCEDURE — 3008F BODY MASS INDEX DOCD: CPT | Mod: CPTII,S$GLB,, | Performed by: INTERNAL MEDICINE

## 2021-06-10 PROCEDURE — 80053 COMPREHEN METABOLIC PANEL: CPT | Performed by: INTERNAL MEDICINE

## 2021-06-10 PROCEDURE — 83036 HEMOGLOBIN GLYCOSYLATED A1C: CPT | Performed by: INTERNAL MEDICINE

## 2021-06-10 PROCEDURE — 1126F PR PAIN SEVERITY QUANTIFIED, NO PAIN PRESENT: ICD-10-PCS | Mod: S$GLB,,, | Performed by: INTERNAL MEDICINE

## 2021-06-10 PROCEDURE — 99395 PREV VISIT EST AGE 18-39: CPT | Mod: S$GLB,,, | Performed by: INTERNAL MEDICINE

## 2021-06-10 PROCEDURE — 1126F AMNT PAIN NOTED NONE PRSNT: CPT | Mod: S$GLB,,, | Performed by: INTERNAL MEDICINE

## 2021-06-10 PROCEDURE — 80061 LIPID PANEL: CPT | Performed by: INTERNAL MEDICINE

## 2021-06-10 PROCEDURE — 99999 PR PBB SHADOW E&M-EST. PATIENT-LVL III: ICD-10-PCS | Mod: PBBFAC,,, | Performed by: INTERNAL MEDICINE

## 2021-06-10 PROCEDURE — 99395 PR PREVENTIVE VISIT,EST,18-39: ICD-10-PCS | Mod: S$GLB,,, | Performed by: INTERNAL MEDICINE

## 2021-06-10 PROCEDURE — 36415 COLL VENOUS BLD VENIPUNCTURE: CPT | Performed by: INTERNAL MEDICINE

## 2021-06-10 RX ORDER — VALACYCLOVIR HYDROCHLORIDE 1 G/1
2000 TABLET, FILM COATED ORAL EVERY 12 HOURS
Qty: 20 TABLET | Refills: 3 | Status: SHIPPED | OUTPATIENT
Start: 2021-06-10 | End: 2022-12-06

## 2021-06-10 RX ORDER — DOXYCYCLINE 100 MG/1
CAPSULE ORAL
COMMUNITY
Start: 2021-01-11 | End: 2021-06-10

## 2021-06-15 ENCOUNTER — PATIENT MESSAGE (OUTPATIENT)
Dept: INTERNAL MEDICINE | Facility: CLINIC | Age: 31
End: 2021-06-15

## 2021-06-15 DIAGNOSIS — R74.8 LIVER ENZYME ELEVATION: Primary | ICD-10-CM

## 2021-06-21 ENCOUNTER — PATIENT MESSAGE (OUTPATIENT)
Dept: INTERNAL MEDICINE | Facility: CLINIC | Age: 31
End: 2021-06-21

## 2021-07-22 ENCOUNTER — LAB VISIT (OUTPATIENT)
Dept: LAB | Facility: HOSPITAL | Age: 31
End: 2021-07-22
Attending: INTERNAL MEDICINE
Payer: COMMERCIAL

## 2021-07-22 DIAGNOSIS — R74.8 LIVER ENZYME ELEVATION: ICD-10-CM

## 2021-07-22 LAB
ALBUMIN SERPL BCP-MCNC: 4.2 G/DL (ref 3.5–5.2)
ALP SERPL-CCNC: 62 U/L (ref 55–135)
ALT SERPL W/O P-5'-P-CCNC: 33 U/L (ref 10–44)
ANION GAP SERPL CALC-SCNC: 8 MMOL/L (ref 8–16)
AST SERPL-CCNC: 24 U/L (ref 10–40)
BILIRUB SERPL-MCNC: 0.6 MG/DL (ref 0.1–1)
BUN SERPL-MCNC: 10 MG/DL (ref 6–20)
CALCIUM SERPL-MCNC: 9.3 MG/DL (ref 8.7–10.5)
CHLORIDE SERPL-SCNC: 104 MMOL/L (ref 95–110)
CO2 SERPL-SCNC: 25 MMOL/L (ref 23–29)
CREAT SERPL-MCNC: 1 MG/DL (ref 0.5–1.4)
EST. GFR  (AFRICAN AMERICAN): >60 ML/MIN/1.73 M^2
EST. GFR  (NON AFRICAN AMERICAN): >60 ML/MIN/1.73 M^2
GLUCOSE SERPL-MCNC: 90 MG/DL (ref 70–110)
POTASSIUM SERPL-SCNC: 3.9 MMOL/L (ref 3.5–5.1)
PROT SERPL-MCNC: 6.9 G/DL (ref 6–8.4)
SODIUM SERPL-SCNC: 137 MMOL/L (ref 136–145)

## 2021-07-22 PROCEDURE — 80053 COMPREHEN METABOLIC PANEL: CPT | Performed by: INTERNAL MEDICINE

## 2021-07-22 PROCEDURE — 36415 COLL VENOUS BLD VENIPUNCTURE: CPT | Performed by: INTERNAL MEDICINE

## 2021-12-14 ENCOUNTER — IMMUNIZATION (OUTPATIENT)
Dept: PHARMACY | Facility: CLINIC | Age: 31
End: 2021-12-14
Payer: COMMERCIAL

## 2021-12-14 DIAGNOSIS — Z23 NEED FOR VACCINATION: Primary | ICD-10-CM

## 2022-06-01 ENCOUNTER — PATIENT MESSAGE (OUTPATIENT)
Dept: DERMATOLOGY | Facility: CLINIC | Age: 32
End: 2022-06-01
Payer: COMMERCIAL

## 2022-11-22 ENCOUNTER — OFFICE VISIT (OUTPATIENT)
Dept: INTERNAL MEDICINE | Facility: CLINIC | Age: 32
End: 2022-11-22
Payer: COMMERCIAL

## 2022-11-22 ENCOUNTER — LAB VISIT (OUTPATIENT)
Dept: LAB | Facility: HOSPITAL | Age: 32
End: 2022-11-22
Attending: INTERNAL MEDICINE
Payer: COMMERCIAL

## 2022-11-22 VITALS
OXYGEN SATURATION: 99 % | HEIGHT: 69 IN | HEART RATE: 70 BPM | BODY MASS INDEX: 29.92 KG/M2 | WEIGHT: 202 LBS | DIASTOLIC BLOOD PRESSURE: 100 MMHG | SYSTOLIC BLOOD PRESSURE: 180 MMHG

## 2022-11-22 DIAGNOSIS — I10 BENIGN HYPERTENSION: ICD-10-CM

## 2022-11-22 DIAGNOSIS — Z00.00 ANNUAL PHYSICAL EXAM: Primary | ICD-10-CM

## 2022-11-22 DIAGNOSIS — Z00.00 ANNUAL PHYSICAL EXAM: ICD-10-CM

## 2022-11-22 LAB
ALBUMIN SERPL BCP-MCNC: 4.4 G/DL (ref 3.5–5.2)
ALP SERPL-CCNC: 73 U/L (ref 55–135)
ALT SERPL W/O P-5'-P-CCNC: 51 U/L (ref 10–44)
ANION GAP SERPL CALC-SCNC: 9 MMOL/L (ref 8–16)
AST SERPL-CCNC: 33 U/L (ref 10–40)
BASOPHILS # BLD AUTO: 0.01 K/UL (ref 0–0.2)
BASOPHILS NFR BLD: 0.2 % (ref 0–1.9)
BILIRUB SERPL-MCNC: 0.7 MG/DL (ref 0.1–1)
BUN SERPL-MCNC: 14 MG/DL (ref 6–20)
CALCIUM SERPL-MCNC: 9.9 MG/DL (ref 8.7–10.5)
CHLORIDE SERPL-SCNC: 102 MMOL/L (ref 95–110)
CHOLEST SERPL-MCNC: 182 MG/DL (ref 120–199)
CHOLEST/HDLC SERPL: 3.5 {RATIO} (ref 2–5)
CO2 SERPL-SCNC: 28 MMOL/L (ref 23–29)
CREAT SERPL-MCNC: 1 MG/DL (ref 0.5–1.4)
DIFFERENTIAL METHOD: ABNORMAL
EOSINOPHIL # BLD AUTO: 0.1 K/UL (ref 0–0.5)
EOSINOPHIL NFR BLD: 1.2 % (ref 0–8)
ERYTHROCYTE [DISTWIDTH] IN BLOOD BY AUTOMATED COUNT: 12.7 % (ref 11.5–14.5)
EST. GFR  (NO RACE VARIABLE): >60 ML/MIN/1.73 M^2
ESTIMATED AVG GLUCOSE: 100 MG/DL (ref 68–131)
GLUCOSE SERPL-MCNC: 104 MG/DL (ref 70–110)
HBA1C MFR BLD: 5.1 % (ref 4–5.6)
HCT VFR BLD AUTO: 42.2 % (ref 40–54)
HDLC SERPL-MCNC: 52 MG/DL (ref 40–75)
HDLC SERPL: 28.6 % (ref 20–50)
HGB BLD-MCNC: 14.5 G/DL (ref 14–18)
IMM GRANULOCYTES # BLD AUTO: 0.1 K/UL (ref 0–0.04)
IMM GRANULOCYTES NFR BLD AUTO: 1.7 % (ref 0–0.5)
LDLC SERPL CALC-MCNC: 100.2 MG/DL (ref 63–159)
LYMPHOCYTES # BLD AUTO: 2.1 K/UL (ref 1–4.8)
LYMPHOCYTES NFR BLD: 36.6 % (ref 18–48)
MCH RBC QN AUTO: 31.4 PG (ref 27–31)
MCHC RBC AUTO-ENTMCNC: 34.4 G/DL (ref 32–36)
MCV RBC AUTO: 91 FL (ref 82–98)
MONOCYTES # BLD AUTO: 0.4 K/UL (ref 0.3–1)
MONOCYTES NFR BLD: 6 % (ref 4–15)
NEUTROPHILS # BLD AUTO: 3.2 K/UL (ref 1.8–7.7)
NEUTROPHILS NFR BLD: 54.3 % (ref 38–73)
NONHDLC SERPL-MCNC: 130 MG/DL
NRBC BLD-RTO: 0 /100 WBC
PLATELET # BLD AUTO: 250 K/UL (ref 150–450)
PMV BLD AUTO: 11.3 FL (ref 9.2–12.9)
POTASSIUM SERPL-SCNC: 3.9 MMOL/L (ref 3.5–5.1)
PROT SERPL-MCNC: 7.2 G/DL (ref 6–8.4)
RBC # BLD AUTO: 4.62 M/UL (ref 4.6–6.2)
SODIUM SERPL-SCNC: 139 MMOL/L (ref 136–145)
TRIGL SERPL-MCNC: 149 MG/DL (ref 30–150)
WBC # BLD AUTO: 5.8 K/UL (ref 3.9–12.7)

## 2022-11-22 PROCEDURE — 85025 COMPLETE CBC W/AUTO DIFF WBC: CPT | Performed by: INTERNAL MEDICINE

## 2022-11-22 PROCEDURE — 4010F ACE/ARB THERAPY RXD/TAKEN: CPT | Mod: CPTII,S$GLB,, | Performed by: INTERNAL MEDICINE

## 2022-11-22 PROCEDURE — 99999 PR PBB SHADOW E&M-EST. PATIENT-LVL III: CPT | Mod: PBBFAC,,, | Performed by: INTERNAL MEDICINE

## 2022-11-22 PROCEDURE — 99395 PR PREVENTIVE VISIT,EST,18-39: ICD-10-PCS | Mod: S$GLB,,, | Performed by: INTERNAL MEDICINE

## 2022-11-22 PROCEDURE — 36415 COLL VENOUS BLD VENIPUNCTURE: CPT | Performed by: INTERNAL MEDICINE

## 2022-11-22 PROCEDURE — 83036 HEMOGLOBIN GLYCOSYLATED A1C: CPT | Performed by: INTERNAL MEDICINE

## 2022-11-22 PROCEDURE — 4010F PR ACE/ARB THEARPY RXD/TAKEN: ICD-10-PCS | Mod: CPTII,S$GLB,, | Performed by: INTERNAL MEDICINE

## 2022-11-22 PROCEDURE — 99999 PR PBB SHADOW E&M-EST. PATIENT-LVL III: ICD-10-PCS | Mod: PBBFAC,,, | Performed by: INTERNAL MEDICINE

## 2022-11-22 PROCEDURE — 99395 PREV VISIT EST AGE 18-39: CPT | Mod: S$GLB,,, | Performed by: INTERNAL MEDICINE

## 2022-11-22 PROCEDURE — 80061 LIPID PANEL: CPT | Performed by: INTERNAL MEDICINE

## 2022-11-22 PROCEDURE — 80053 COMPREHEN METABOLIC PANEL: CPT | Performed by: INTERNAL MEDICINE

## 2022-11-22 RX ORDER — AMLODIPINE AND OLMESARTAN MEDOXOMIL 5; 20 MG/1; MG/1
1 TABLET ORAL DAILY
Qty: 90 TABLET | Refills: 3 | Status: SHIPPED | OUTPATIENT
Start: 2022-11-22 | End: 2022-12-06

## 2022-11-22 NOTE — PROGRESS NOTES
"    CHIEF COMPLAINT     Chief Complaint   Patient presents with    Annual Exam    Hypertension     Pt has been checking blood pressure on a regular and been getting some high readings        HPI     Samuel Mcgraw is a 32 y.o. male here today for     Elevated BP  Bps running 160-170 systolic. Reports diastolic. Reports he can tell BP is elevated when he exercises.  Lowest Bps 150 sys  +both parents with HTN,     +7 week old boy SV on the med but still coughing    Personally Reviewed Patient's Medical, surgical, family and social hx. Changes updated in [facility].  Care Team updated in Epic    Review of Systems:  Review of Systems   Constitutional:  Negative for activity change and unexpected weight change.   HENT:  Positive for rhinorrhea. Negative for hearing loss and trouble swallowing.    Eyes:  Negative for discharge and visual disturbance.   Respiratory:  Negative for chest tightness and wheezing.    Cardiovascular:  Negative for chest pain and palpitations.   Gastrointestinal:  Negative for blood in stool, constipation, diarrhea and vomiting.   Endocrine: Negative for polydipsia and polyuria.   Genitourinary:  Negative for difficulty urinating, hematuria and urgency.   Musculoskeletal:  Negative for arthralgias, joint swelling and neck pain.   Neurological:  Negative for weakness and headaches.   Psychiatric/Behavioral:  Negative for confusion and dysphoric mood.      Health Maintenance:   Reviewed with patient  Due for the following:      PHYSICAL EXAM     BP (!) 180/100 (BP Location: Right arm, Patient Position: Sitting, BP Method: Medium (Manual))   Pulse 70   Ht 5' 9" (1.753 m)   Wt 91.6 kg (202 lb)   SpO2 99%   BMI 29.83 kg/m²     Gen: Well Appearing, NAD  HEENT: PERR, EOMI  Neck: FROM, no thyromegaly, no cervical adenopathy  CVD: RRR, no M/R/G  Pulm: Normal work of breathing, CTAB, no wheezing  Abd:  Soft, NT, ND non TTP, no mass  MSK: no LE edema  Neuro: A&Ox3, gait normal, speech normal  Mood; Mood " normal, behavior normal, thought process linear       LABS     Labs reviewed; ordered    ASSESSMENT     1. Annual physical exam  CBC Auto Differential    Comprehensive Metabolic Panel    Hemoglobin A1C    Lipid Panel      2. Benign hypertension  amlodipine-olmesartan (GOMEZ) 5-20 mg per tablet              Plan     Samuel Mcgraw is a 32 y.o. male with  1. Annual physical exam  Updated problem list, medical history, care team and discussed HM.     - CBC Auto Differential; Future  - Comprehensive Metabolic Panel; Future  - Hemoglobin A1C; Future  - Lipid Panel; Future    2. Benign hypertension  Elevated both at home in the office.  Will start Gomez 5-20.  Have him follow-up in 1 month for blood pressure check, if not at goal, increase either amlodipine and olmesartan components medication and have him follow-up in a month  - amlodipine-olmesartan (GOMEZ) 5-20 mg per tablet; Take 1 tablet by mouth once daily.  Dispense: 90 tablet; Refill: 3    Ernesto Forbes MD

## 2022-12-06 ENCOUNTER — OFFICE VISIT (OUTPATIENT)
Dept: INTERNAL MEDICINE | Facility: CLINIC | Age: 32
End: 2022-12-06
Payer: COMMERCIAL

## 2022-12-06 VITALS
HEIGHT: 69 IN | BODY MASS INDEX: 29.92 KG/M2 | OXYGEN SATURATION: 99 % | HEART RATE: 73 BPM | WEIGHT: 202 LBS | SYSTOLIC BLOOD PRESSURE: 148 MMHG | DIASTOLIC BLOOD PRESSURE: 82 MMHG

## 2022-12-06 DIAGNOSIS — J30.9 ALLERGIC RHINITIS WITH POSTNASAL DRIP: ICD-10-CM

## 2022-12-06 DIAGNOSIS — E66.3 OVERWEIGHT (BMI 25.0-29.9): ICD-10-CM

## 2022-12-06 DIAGNOSIS — R09.82 ALLERGIC RHINITIS WITH POSTNASAL DRIP: ICD-10-CM

## 2022-12-06 DIAGNOSIS — I10 HTN (HYPERTENSION), BENIGN: Primary | ICD-10-CM

## 2022-12-06 PROCEDURE — 4010F PR ACE/ARB THEARPY RXD/TAKEN: ICD-10-PCS | Mod: CPTII,S$GLB,, | Performed by: NURSE PRACTITIONER

## 2022-12-06 PROCEDURE — 99999 PR PBB SHADOW E&M-EST. PATIENT-LVL III: CPT | Mod: PBBFAC,,, | Performed by: NURSE PRACTITIONER

## 2022-12-06 PROCEDURE — 99999 PR PBB SHADOW E&M-EST. PATIENT-LVL III: ICD-10-PCS | Mod: PBBFAC,,, | Performed by: NURSE PRACTITIONER

## 2022-12-06 PROCEDURE — 99214 PR OFFICE/OUTPT VISIT, EST, LEVL IV, 30-39 MIN: ICD-10-PCS | Mod: S$GLB,,, | Performed by: NURSE PRACTITIONER

## 2022-12-06 PROCEDURE — 4010F ACE/ARB THERAPY RXD/TAKEN: CPT | Mod: CPTII,S$GLB,, | Performed by: NURSE PRACTITIONER

## 2022-12-06 PROCEDURE — 99214 OFFICE O/P EST MOD 30 MIN: CPT | Mod: S$GLB,,, | Performed by: NURSE PRACTITIONER

## 2022-12-06 RX ORDER — FLUTICASONE PROPIONATE 50 MCG
1 SPRAY, SUSPENSION (ML) NASAL 2 TIMES DAILY
Qty: 18.2 ML | Refills: 0 | Status: SHIPPED | OUTPATIENT
Start: 2022-12-06 | End: 2023-01-30

## 2022-12-06 RX ORDER — AMLODIPINE AND OLMESARTAN MEDOXOMIL 10; 20 MG/1; MG/1
1 TABLET ORAL DAILY
Qty: 90 TABLET | Refills: 3 | Status: SHIPPED | OUTPATIENT
Start: 2022-12-06 | End: 2024-01-04

## 2022-12-06 RX ORDER — METHYLPREDNISOLONE 4 MG/1
TABLET ORAL
Qty: 21 EACH | Refills: 0 | Status: SHIPPED | OUTPATIENT
Start: 2022-12-06 | End: 2022-12-27

## 2022-12-06 NOTE — PROGRESS NOTES
"Subjective:       Patient ID: Samuel Mcgraw is a 32 y.o. male.    Chief Complaint: Follow-up    Pt of Dr. Forbes, here for BP f/u    Seen 22 by PCP for annual, had been checking BP at home and having elevated readings. That visit it was 180/100. Plan of care that visit was the followin. Benign hypertension  Elevated both at home in the office.  Will start Gomez 5-20.  Have him follow-up in 1 month for blood pressure check, if not at goal, increase either amlodipine and olmesartan components medication and have him follow-up in a month  - amlodipine-olmesartan (GOMEZ) 5-20 mg per tablet; Take 1 tablet by mouth once daily.  Dispense: 90 tablet; Refill: 3    Has been compliant with meds daily. He has been checking his BP at home daily. Averaging 160s/80s at home still.     Also has been having URI symptoms. Son dx with RSV, he has been having cold symptoms for 2 weeks. Tried claritin without relief. Needs something to "knock it out" as he has a part coming up.    Review of Systems   Constitutional:  Negative for activity change, appetite change, chills, diaphoresis, fatigue, fever and unexpected weight change.   HENT:  Positive for nasal congestion, postnasal drip, rhinorrhea, sinus pressure/congestion and sore throat. Negative for ear pain.    Eyes:  Negative for visual disturbance.   Respiratory:  Positive for cough. Negative for chest tightness and shortness of breath.    Cardiovascular:  Negative for chest pain, palpitations, leg swelling and claudication.   Gastrointestinal:  Negative for abdominal pain, constipation, diarrhea, nausea and vomiting.   Endocrine: Negative for cold intolerance, heat intolerance, polydipsia, polyphagia and polyuria.   Genitourinary:  Negative for dysuria.   Musculoskeletal:  Negative for arthralgias and myalgias.   Allergic/Immunologic: Positive for environmental allergies. Negative for food allergies and frequent infections.        Sleeps with ceiling fan  Shedding dog " in home  Son sick with RSV recently   Neurological:  Positive for headaches. Negative for dizziness, syncope, weakness, light-headedness and numbness.   Hematological:  Negative for adenopathy. Does not bruise/bleed easily.   Psychiatric/Behavioral:  Negative for suicidal ideas.        Review of patient's allergies indicates:  No Known Allergies    Current Outpatient Medications:     amlodipine-olmesartan (GOMEZ) 5-20 mg per tablet, Take 1 tablet by mouth once daily., Disp: 90 tablet, Rfl: 3    Patient Active Problem List   Diagnosis    Family history of heart disease    H/O cold sores     Past Medical History:   Diagnosis Date    Anxiety     History of cold sores     HSV infection     oral    Hx of psychiatric care     Previous medications: Celexa, gabapentin, Abilify    Rupali     elevated mood, increased goal-directed activity, impulsivity, grandiosity, pressured speech    Psychiatric problem     Therapy     Psychiatrist: Dr. Urbina.      No past surgical history on file.    Social History     Socioeconomic History    Marital status:     Number of children: 1   Occupational History    Occupation: Finance and Operations Management     Employer: [employer]     Comment: [employer]   Tobacco Use    Smoking status: Former     Packs/day: 0.25     Years: 2.50     Pack years: 0.63     Types: Cigarettes     Quit date: 2014     Years since quittin.3    Smokeless tobacco: Former   Substance and Sexual Activity    Alcohol use: Yes     Comment: socially    Drug use: Yes     Types: Marijuana     Comment: Vapes THC oil X1 month     Sexual activity: Yes     Partners: Female     Birth control/protection: OCP     Social Determinants of Health     Financial Resource Strain: Low Risk     Difficulty of Paying Living Expenses: Not hard at all   Food Insecurity: No Food Insecurity    Worried About Running Out of Food in the Last Year: Never true    Ran Out of Food in the Last Year: Never true   Transportation Needs: No  "Transportation Needs    Lack of Transportation (Medical): No    Lack of Transportation (Non-Medical): No   Physical Activity: Sufficiently Active    Days of Exercise per Week: 5 days    Minutes of Exercise per Session: 40 min   Stress: Stress Concern Present    Feeling of Stress : To some extent   Social Connections: Unknown    Frequency of Communication with Friends and Family: More than three times a week    Frequency of Social Gatherings with Friends and Family: Once a week    Active Member of Clubs or Organizations: Yes    Attends Club or Organization Meetings: More than 4 times per year    Marital Status:    Housing Stability: Unknown    Unable to Pay for Housing in the Last Year: No    Unstable Housing in the Last Year: No     Family History   Problem Relation Age of Onset    No Known Problems Mother     Hyperlipidemia Father     Hypertension Father     No Known Problems Sister     No Known Problems Brother     Cancer Maternal Grandfather         melanoma    Hyperlipidemia Paternal Grandfather     Heart disease Paternal Grandfather     Hypertension Paternal Grandfather     Diabetes Neg Hx        Objective:      Vitals:    12/06/22 1407   BP: (!) 148/82   Pulse: 73   SpO2: 99%   Weight: 91.6 kg (202 lb)   Height: 5' 9" (1.753 m)   PainSc: 0-No pain     Body mass index is 29.83 kg/m².    Physical Exam  Vitals and nursing note reviewed.   Constitutional:       Appearance: Normal appearance.   HENT:      Head: Normocephalic.      Right Ear: Tympanic membrane, ear canal and external ear normal. There is no impacted cerumen.      Left Ear: Tympanic membrane, ear canal and external ear normal. There is no impacted cerumen.      Nose: Mucosal edema, congestion and rhinorrhea present.      Right Sinus: Maxillary sinus tenderness present. No frontal sinus tenderness.      Left Sinus: No maxillary sinus tenderness or frontal sinus tenderness.      Comments: Clear PND noted on exam         Mouth/Throat:      Mouth: " Mucous membranes are moist.      Pharynx: Oropharynx is clear. No oropharyngeal exudate or posterior oropharyngeal erythema.   Eyes:      Conjunctiva/sclera: Conjunctivae normal.   Cardiovascular:      Rate and Rhythm: Normal rate and regular rhythm.      Pulses: Normal pulses.      Heart sounds: Normal heart sounds.   Pulmonary:      Effort: Pulmonary effort is normal.      Breath sounds: Normal breath sounds.   Musculoskeletal:         General: Normal range of motion.      Cervical back: Normal range of motion and neck supple.   Lymphadenopathy:      Cervical: No cervical adenopathy.   Skin:     General: Skin is warm and dry.   Neurological:      General: No focal deficit present.      Mental Status: He is alert and oriented to person, place, and time.   Psychiatric:         Mood and Affect: Mood normal.         Behavior: Behavior normal.         Thought Content: Thought content normal.         Judgment: Judgment normal.       Assessment:       Problem List Items Addressed This Visit    None  Visit Diagnoses       HTN (hypertension), benign    -  Primary    Relevant Medications    amlodipine-olmesartan (GOMEZ) 10-20 mg per tablet    Allergic rhinitis with postnasal drip        Relevant Medications    methylPREDNISolone (MEDROL DOSEPACK) 4 mg tablet    fluticasone propionate (FLONASE) 50 mcg/actuation nasal spray    Overweight (BMI 25.0-29.9)        BMI 29.0-29.9,adult                Plan:       Samuel was seen today for follow-up.    Diagnoses and all orders for this visit:    HTN (hypertension), benign  -     amlodipine-olmesartan (GOMEZ) 10-20 mg per tablet; Take 1 tablet by mouth once daily.    Change Gomez 5/20mg daily to 10/20 mg daily, sent new script to pharmacy, discard old dose    Take medications as prescribed.    Monitor BP at home, goal BP < or = 140/80, call office if consistently above this range.--check twice a week once in AM and PM, record BP and heart rate, send me readings for review in 4  weeks    Follow low salt DASH diet and exercise.    BMI reviewed.    Overweight (BMI 25.0-29.9)  BMI reviewed.    Diet and exercise to lose weight.    BMI 29.0-29.9,adult  BMI reviewed    Allergic rhinitis with postnasal drip  -     methylPREDNISolone (MEDROL DOSEPACK) 4 mg tablet; use as directed  -     fluticasone propionate (FLONASE) 50 mcg/actuation nasal spray; 1 spray (50 mcg total) by Each Nostril route 2 (two) times daily.    Go to ED if Headaches, blurred vision, chest pain, or SOB occurs along with elevated readings > or = 160/90.    Meds as prescribed    Rest and Fluids, Tylenol or Motrin otc as needed for pain and or fever    Warm liquids to thin mucus    Allergen avoidance discussed, humidified air recommended    Warm salt water gargles as needed for throat pain    Nasal spray, taught how to correctly use    Follow up if symptoms worsen or fail to improve.

## 2022-12-06 NOTE — PATIENT INSTRUCTIONS
Change Rajinder 5/20mg daily to 10/20 mg daily, sent new script to pharmacy, discard old dose    Take medications as prescribed.    Monitor BP at home, goal BP < or = 140/80, call office if consistently above this range.--check twice a week once in AM and PM, record BP and heart rate, send me readings for review in 4 weeks    Follow low salt DASH diet and exercise.    BMI reviewed.    Go to ED if Headaches, blurred vision, chest pain, or SOB occurs along with elevated readings > or = 160/90.    Meds as prescribed    Rest and Fluids, Tylenol or Motrin otc as needed for pain and or fever    Warm liquids to thin mucus    Allergen avoidance discussed, humidified air recommended    Warm salt water gargles as needed for throat pain    Nasal spray, taught how to correctly use

## 2023-06-15 ENCOUNTER — PATIENT MESSAGE (OUTPATIENT)
Dept: INTERNAL MEDICINE | Facility: CLINIC | Age: 33
End: 2023-06-15
Payer: COMMERCIAL

## 2023-07-11 ENCOUNTER — OFFICE VISIT (OUTPATIENT)
Dept: INTERNAL MEDICINE | Facility: CLINIC | Age: 33
End: 2023-07-11
Payer: COMMERCIAL

## 2023-07-11 VITALS
OXYGEN SATURATION: 99 % | WEIGHT: 191.56 LBS | DIASTOLIC BLOOD PRESSURE: 78 MMHG | HEIGHT: 69 IN | SYSTOLIC BLOOD PRESSURE: 138 MMHG | HEART RATE: 81 BPM | BODY MASS INDEX: 28.37 KG/M2

## 2023-07-11 DIAGNOSIS — I10 ESSENTIAL HYPERTENSION, BENIGN: ICD-10-CM

## 2023-07-11 DIAGNOSIS — Z00.00 ANNUAL PHYSICAL EXAM: Primary | ICD-10-CM

## 2023-07-11 DIAGNOSIS — J01.00 ACUTE NON-RECURRENT MAXILLARY SINUSITIS: ICD-10-CM

## 2023-07-11 PROCEDURE — 3078F DIAST BP <80 MM HG: CPT | Mod: CPTII,S$GLB,, | Performed by: INTERNAL MEDICINE

## 2023-07-11 PROCEDURE — 1159F PR MEDICATION LIST DOCUMENTED IN MEDICAL RECORD: ICD-10-PCS | Mod: CPTII,S$GLB,, | Performed by: INTERNAL MEDICINE

## 2023-07-11 PROCEDURE — 1159F MED LIST DOCD IN RCRD: CPT | Mod: CPTII,S$GLB,, | Performed by: INTERNAL MEDICINE

## 2023-07-11 PROCEDURE — 99395 PREV VISIT EST AGE 18-39: CPT | Mod: S$GLB,,, | Performed by: INTERNAL MEDICINE

## 2023-07-11 PROCEDURE — 3075F SYST BP GE 130 - 139MM HG: CPT | Mod: CPTII,S$GLB,, | Performed by: INTERNAL MEDICINE

## 2023-07-11 PROCEDURE — 4010F PR ACE/ARB THEARPY RXD/TAKEN: ICD-10-PCS | Mod: CPTII,S$GLB,, | Performed by: INTERNAL MEDICINE

## 2023-07-11 PROCEDURE — 3078F PR MOST RECENT DIASTOLIC BLOOD PRESSURE < 80 MM HG: ICD-10-PCS | Mod: CPTII,S$GLB,, | Performed by: INTERNAL MEDICINE

## 2023-07-11 PROCEDURE — 1160F RVW MEDS BY RX/DR IN RCRD: CPT | Mod: CPTII,S$GLB,, | Performed by: INTERNAL MEDICINE

## 2023-07-11 PROCEDURE — 3008F BODY MASS INDEX DOCD: CPT | Mod: CPTII,S$GLB,, | Performed by: INTERNAL MEDICINE

## 2023-07-11 PROCEDURE — 99999 PR PBB SHADOW E&M-EST. PATIENT-LVL IV: ICD-10-PCS | Mod: PBBFAC,,, | Performed by: INTERNAL MEDICINE

## 2023-07-11 PROCEDURE — 3075F PR MOST RECENT SYSTOLIC BLOOD PRESS GE 130-139MM HG: ICD-10-PCS | Mod: CPTII,S$GLB,, | Performed by: INTERNAL MEDICINE

## 2023-07-11 PROCEDURE — 99395 PR PREVENTIVE VISIT,EST,18-39: ICD-10-PCS | Mod: S$GLB,,, | Performed by: INTERNAL MEDICINE

## 2023-07-11 PROCEDURE — 3008F PR BODY MASS INDEX (BMI) DOCUMENTED: ICD-10-PCS | Mod: CPTII,S$GLB,, | Performed by: INTERNAL MEDICINE

## 2023-07-11 PROCEDURE — 99999 PR PBB SHADOW E&M-EST. PATIENT-LVL IV: CPT | Mod: PBBFAC,,, | Performed by: INTERNAL MEDICINE

## 2023-07-11 PROCEDURE — 4010F ACE/ARB THERAPY RXD/TAKEN: CPT | Mod: CPTII,S$GLB,, | Performed by: INTERNAL MEDICINE

## 2023-07-11 PROCEDURE — 1160F PR REVIEW ALL MEDS BY PRESCRIBER/CLIN PHARMACIST DOCUMENTED: ICD-10-PCS | Mod: CPTII,S$GLB,, | Performed by: INTERNAL MEDICINE

## 2023-07-11 RX ORDER — AMOXICILLIN AND CLAVULANATE POTASSIUM 875; 125 MG/1; MG/1
1 TABLET, FILM COATED ORAL EVERY 12 HOURS
Qty: 14 TABLET | Refills: 0 | Status: SHIPPED | OUTPATIENT
Start: 2023-07-11 | End: 2023-07-18

## 2023-07-18 ENCOUNTER — LAB VISIT (OUTPATIENT)
Dept: LAB | Facility: HOSPITAL | Age: 33
End: 2023-07-18
Attending: INTERNAL MEDICINE
Payer: COMMERCIAL

## 2023-07-18 DIAGNOSIS — Z00.00 ANNUAL PHYSICAL EXAM: ICD-10-CM

## 2023-07-18 LAB
ALBUMIN SERPL BCP-MCNC: 4.7 G/DL (ref 3.5–5.2)
ALP SERPL-CCNC: 80 U/L (ref 55–135)
ALT SERPL W/O P-5'-P-CCNC: 30 U/L (ref 10–44)
ANION GAP SERPL CALC-SCNC: 9 MMOL/L (ref 8–16)
AST SERPL-CCNC: 26 U/L (ref 10–40)
BASOPHILS # BLD AUTO: 0.03 K/UL (ref 0–0.2)
BASOPHILS NFR BLD: 0.5 % (ref 0–1.9)
BILIRUB SERPL-MCNC: 0.7 MG/DL (ref 0.1–1)
BUN SERPL-MCNC: 13 MG/DL (ref 6–20)
CALCIUM SERPL-MCNC: 9.9 MG/DL (ref 8.7–10.5)
CHLORIDE SERPL-SCNC: 103 MMOL/L (ref 95–110)
CHOLEST SERPL-MCNC: 210 MG/DL (ref 120–199)
CHOLEST/HDLC SERPL: 3.3 {RATIO} (ref 2–5)
CO2 SERPL-SCNC: 26 MMOL/L (ref 23–29)
CREAT SERPL-MCNC: 1 MG/DL (ref 0.5–1.4)
DIFFERENTIAL METHOD: ABNORMAL
EOSINOPHIL # BLD AUTO: 0.1 K/UL (ref 0–0.5)
EOSINOPHIL NFR BLD: 1.3 % (ref 0–8)
ERYTHROCYTE [DISTWIDTH] IN BLOOD BY AUTOMATED COUNT: 13.4 % (ref 11.5–14.5)
EST. GFR  (NO RACE VARIABLE): >60 ML/MIN/1.73 M^2
ESTIMATED AVG GLUCOSE: 100 MG/DL (ref 68–131)
GLUCOSE SERPL-MCNC: 107 MG/DL (ref 70–110)
HBA1C MFR BLD: 5.1 % (ref 4–5.6)
HCT VFR BLD AUTO: 44.9 % (ref 40–54)
HDLC SERPL-MCNC: 63 MG/DL (ref 40–75)
HDLC SERPL: 30 % (ref 20–50)
HGB BLD-MCNC: 15.1 G/DL (ref 14–18)
IMM GRANULOCYTES # BLD AUTO: 0.05 K/UL (ref 0–0.04)
IMM GRANULOCYTES NFR BLD AUTO: 0.8 % (ref 0–0.5)
LDLC SERPL CALC-MCNC: 133 MG/DL (ref 63–159)
LYMPHOCYTES # BLD AUTO: 2.2 K/UL (ref 1–4.8)
LYMPHOCYTES NFR BLD: 35.3 % (ref 18–48)
MCH RBC QN AUTO: 31.1 PG (ref 27–31)
MCHC RBC AUTO-ENTMCNC: 33.6 G/DL (ref 32–36)
MCV RBC AUTO: 92 FL (ref 82–98)
MONOCYTES # BLD AUTO: 0.5 K/UL (ref 0.3–1)
MONOCYTES NFR BLD: 7.4 % (ref 4–15)
NEUTROPHILS # BLD AUTO: 3.5 K/UL (ref 1.8–7.7)
NEUTROPHILS NFR BLD: 54.7 % (ref 38–73)
NONHDLC SERPL-MCNC: 147 MG/DL
NRBC BLD-RTO: 0 /100 WBC
PLATELET # BLD AUTO: 283 K/UL (ref 150–450)
PMV BLD AUTO: 11.2 FL (ref 9.2–12.9)
POTASSIUM SERPL-SCNC: 4.7 MMOL/L (ref 3.5–5.1)
PROT SERPL-MCNC: 7.7 G/DL (ref 6–8.4)
RBC # BLD AUTO: 4.86 M/UL (ref 4.6–6.2)
SODIUM SERPL-SCNC: 138 MMOL/L (ref 136–145)
TRIGL SERPL-MCNC: 70 MG/DL (ref 30–150)
WBC # BLD AUTO: 6.35 K/UL (ref 3.9–12.7)

## 2023-07-18 PROCEDURE — 80061 LIPID PANEL: CPT | Performed by: INTERNAL MEDICINE

## 2023-07-18 PROCEDURE — 83036 HEMOGLOBIN GLYCOSYLATED A1C: CPT | Performed by: INTERNAL MEDICINE

## 2023-07-18 PROCEDURE — 85025 COMPLETE CBC W/AUTO DIFF WBC: CPT | Performed by: INTERNAL MEDICINE

## 2023-07-18 PROCEDURE — 80053 COMPREHEN METABOLIC PANEL: CPT | Performed by: INTERNAL MEDICINE

## 2023-07-18 PROCEDURE — 36415 COLL VENOUS BLD VENIPUNCTURE: CPT | Performed by: INTERNAL MEDICINE

## 2023-09-08 NOTE — PROGRESS NOTES
"    CHIEF COMPLAINT     Chief Complaint   Patient presents with    Annual Exam    Sinus Problem    Fever       HPI     Samuel Mcgraw is a 33 y.o. male here today for annual exam    BP better controlled on current medication    Reports 7 days of sinus pressure, congestion, Post nasal drip. Reports fever broke yesterday. Reports kids had similar illness last week and have recovered. He reports traveling for work. He went to [place], [employer]  for work.     Personally Reviewed Patient's Medical, surgical, family and social hx. [facility].  Care Team updated in Epic    Review of Systems:  Review of Systems   Constitutional:  Negative for activity change and unexpected weight change.   HENT:  Positive for rhinorrhea. Negative for hearing loss and trouble swallowing.    Eyes:  Negative for discharge and visual disturbance.   Respiratory:  Negative for chest tightness and wheezing.    Cardiovascular:  Negative for chest pain and palpitations.   Gastrointestinal:  Negative for blood in stool, constipation, diarrhea and vomiting.   Endocrine: Negative for polydipsia and polyuria.   Genitourinary:  Negative for difficulty urinating, hematuria and urgency.   Musculoskeletal:  Negative for arthralgias, joint swelling and neck pain.   Neurological:  Negative for weakness and headaches.   Psychiatric/Behavioral:  Negative for confusion and dysphoric mood.      Health Maintenance:   Reviewed with patient  Due for the following:      PHYSICAL EXAM     BP (!) 140/78 (BP Location: Right arm, Patient Position: Sitting)   Pulse 81   Ht 5' 9" (1.753 m)   Wt 86.9 kg (191 lb 9.3 oz)   SpO2 99%   BMI 28.29 kg/m²     Gen: Well Appearing, NAD  HEENT: PERR, EOMI  Neck: FROM, no thyromegaly, no cervical adenopathy  CVD: RRR, no M/R/G  Pulm: Normal work of breathing, CTAB, no wheezing  Abd:  Soft, NT, ND non TTP, no mass  MSK: no LE edema  Neuro: A&Ox3, gait normal, speech normal  Mood; Mood normal, behavior normal, thought " process linear       LABS     Labs reviewed; Notable for    ASSESSMENT     1. Annual physical exam  CBC Auto Differential    Comprehensive Metabolic Panel    Hemoglobin A1C    Lipid Panel      2. Essential hypertension, benign        3. Acute non-recurrent maxillary sinusitis  amoxicillin-clavulanate 875-125mg (AUGMENTIN) 875-125 mg per tablet              Plan     Samuel Mcgraw is a 33 y.o. male with  1. Annual physical exam  Updated problem list, medical history, care team and discussed HM.     - CBC Auto Differential; Future  - Comprehensive Metabolic Panel; Future  - Hemoglobin A1C; Future  - Lipid Panel; Future    2. Essential hypertension, benign  At goal continue kj 10-20    3. Acute non-recurrent maxillary sinusitis  Recommend use of OTC afrin x 48 hours and start abx if not improved since he will have had sx >10 days at that time.  - amoxicillin-clavulanate 875-125mg (AUGMENTIN) 875-125 mg per tablet; Take 1 tablet by mouth every 12 (twelve) hours. for 7 days  Dispense: 14 tablet; Refill: 0      Ernesto Forbes MD       No

## 2024-01-04 DIAGNOSIS — I10 HTN (HYPERTENSION), BENIGN: ICD-10-CM

## 2024-01-04 RX ORDER — AMLODIPINE AND OLMESARTAN MEDOXOMIL 10; 20 MG/1; MG/1
1 TABLET ORAL DAILY
Qty: 90 TABLET | Refills: 1 | Status: SHIPPED | OUTPATIENT
Start: 2024-01-04

## 2024-01-04 NOTE — TELEPHONE ENCOUNTER
No care due was identified.  [facility] Embedded Care Due Messages. Reference number: [id number].   1/04/2024 2:16:17 PM CST

## 2024-01-04 NOTE — TELEPHONE ENCOUNTER
Refill Routing Note   Medication(s) are not appropriate for processing by [facility] for the following reason(s):        No active prescription written by provider:     [facility] action(s):  Defer      Medication Therapy Plan:         Appointments  past 12m or future 3m with PCP    Date Provider   Last Visit   7/11/2023 Ernesto Forbes MD   Next Visit   Visit date not found Ernesto Forbes MD   ED visits in past 90 days: 0        Note composed:2:16 PM 01/04/2024

## 2024-05-14 NOTE — PATIENT INSTRUCTIONS
Fasting labs ordered and drawn today, will call with results, if results ok, RTC in 1 yr for annual or sooner prn with one of MDs I work with who can be your new PCP: Dr. Josey Barber, Dr. Milo Varghese, Dr. Tangela Hu, Dr. Tarun Ashton, Dr. Abiodun Gamez, or Dr. Ernesto Forbes  
no irregular menses

## 2024-05-23 ENCOUNTER — PATIENT MESSAGE (OUTPATIENT)
Dept: INTERNAL MEDICINE | Facility: CLINIC | Age: 34
End: 2024-05-23

## 2024-05-23 ENCOUNTER — OFFICE VISIT (OUTPATIENT)
Dept: INTERNAL MEDICINE | Facility: CLINIC | Age: 34
End: 2024-05-23
Payer: COMMERCIAL

## 2024-05-23 ENCOUNTER — LAB VISIT (OUTPATIENT)
Dept: LAB | Facility: HOSPITAL | Age: 34
End: 2024-05-23
Attending: INTERNAL MEDICINE
Payer: COMMERCIAL

## 2024-05-23 VITALS
HEIGHT: 69 IN | BODY MASS INDEX: 28.9 KG/M2 | SYSTOLIC BLOOD PRESSURE: 132 MMHG | WEIGHT: 195.13 LBS | OXYGEN SATURATION: 97 % | DIASTOLIC BLOOD PRESSURE: 72 MMHG | HEART RATE: 80 BPM

## 2024-05-23 DIAGNOSIS — Z00.00 ANNUAL PHYSICAL EXAM: ICD-10-CM

## 2024-05-23 DIAGNOSIS — I10 HTN (HYPERTENSION), BENIGN: ICD-10-CM

## 2024-05-23 DIAGNOSIS — Z00.00 ANNUAL PHYSICAL EXAM: Primary | ICD-10-CM

## 2024-05-23 LAB
ALBUMIN SERPL BCP-MCNC: 4.4 G/DL (ref 3.5–5.2)
ALP SERPL-CCNC: 68 U/L (ref 55–135)
ALT SERPL W/O P-5'-P-CCNC: 35 U/L (ref 10–44)
ANION GAP SERPL CALC-SCNC: 6 MMOL/L (ref 8–16)
AST SERPL-CCNC: 23 U/L (ref 10–40)
BASOPHILS # BLD AUTO: 0.05 K/UL (ref 0–0.2)
BASOPHILS NFR BLD: 0.7 % (ref 0–1.9)
BILIRUB SERPL-MCNC: 0.7 MG/DL (ref 0.1–1)
BUN SERPL-MCNC: 11 MG/DL (ref 6–20)
CALCIUM SERPL-MCNC: 9.8 MG/DL (ref 8.7–10.5)
CHLORIDE SERPL-SCNC: 104 MMOL/L (ref 95–110)
CHOLEST SERPL-MCNC: 202 MG/DL (ref 120–199)
CHOLEST/HDLC SERPL: 3.9 {RATIO} (ref 2–5)
CO2 SERPL-SCNC: 28 MMOL/L (ref 23–29)
CREAT SERPL-MCNC: 1 MG/DL (ref 0.5–1.4)
DIFFERENTIAL METHOD BLD: ABNORMAL
EOSINOPHIL # BLD AUTO: 0.1 K/UL (ref 0–0.5)
EOSINOPHIL NFR BLD: 1.1 % (ref 0–8)
ERYTHROCYTE [DISTWIDTH] IN BLOOD BY AUTOMATED COUNT: 13.2 % (ref 11.5–14.5)
EST. GFR  (NO RACE VARIABLE): >60 ML/MIN/1.73 M^2
ESTIMATED AVG GLUCOSE: 105 MG/DL (ref 68–131)
GLUCOSE SERPL-MCNC: 99 MG/DL (ref 70–110)
HBA1C MFR BLD: 5.3 % (ref 4–5.6)
HCT VFR BLD AUTO: 44.7 % (ref 40–54)
HDLC SERPL-MCNC: 52 MG/DL (ref 40–75)
HDLC SERPL: 25.7 % (ref 20–50)
HGB BLD-MCNC: 15.1 G/DL (ref 14–18)
IMM GRANULOCYTES # BLD AUTO: 0.05 K/UL (ref 0–0.04)
IMM GRANULOCYTES NFR BLD AUTO: 0.7 % (ref 0–0.5)
LDLC SERPL CALC-MCNC: 127.8 MG/DL (ref 63–159)
LYMPHOCYTES # BLD AUTO: 2.5 K/UL (ref 1–4.8)
LYMPHOCYTES NFR BLD: 33.2 % (ref 18–48)
MCH RBC QN AUTO: 30.8 PG (ref 27–31)
MCHC RBC AUTO-ENTMCNC: 33.8 G/DL (ref 32–36)
MCV RBC AUTO: 91 FL (ref 82–98)
MONOCYTES # BLD AUTO: 0.5 K/UL (ref 0.3–1)
MONOCYTES NFR BLD: 6.6 % (ref 4–15)
NEUTROPHILS # BLD AUTO: 4.4 K/UL (ref 1.8–7.7)
NEUTROPHILS NFR BLD: 57.7 % (ref 38–73)
NONHDLC SERPL-MCNC: 150 MG/DL
NRBC BLD-RTO: 0 /100 WBC
PLATELET # BLD AUTO: 284 K/UL (ref 150–450)
PMV BLD AUTO: 11.2 FL (ref 9.2–12.9)
POTASSIUM SERPL-SCNC: 4.2 MMOL/L (ref 3.5–5.1)
PROT SERPL-MCNC: 7.2 G/DL (ref 6–8.4)
RBC # BLD AUTO: 4.9 M/UL (ref 4.6–6.2)
SODIUM SERPL-SCNC: 138 MMOL/L (ref 136–145)
TRIGL SERPL-MCNC: 111 MG/DL (ref 30–150)
WBC # BLD AUTO: 7.57 K/UL (ref 3.9–12.7)

## 2024-05-23 PROCEDURE — 3078F DIAST BP <80 MM HG: CPT | Mod: CPTII,S$GLB,, | Performed by: INTERNAL MEDICINE

## 2024-05-23 PROCEDURE — 3075F SYST BP GE 130 - 139MM HG: CPT | Mod: CPTII,S$GLB,, | Performed by: INTERNAL MEDICINE

## 2024-05-23 PROCEDURE — 4010F ACE/ARB THERAPY RXD/TAKEN: CPT | Mod: CPTII,S$GLB,, | Performed by: INTERNAL MEDICINE

## 2024-05-23 PROCEDURE — 83036 HEMOGLOBIN GLYCOSYLATED A1C: CPT | Performed by: INTERNAL MEDICINE

## 2024-05-23 PROCEDURE — 80061 LIPID PANEL: CPT | Performed by: INTERNAL MEDICINE

## 2024-05-23 PROCEDURE — 1159F MED LIST DOCD IN RCRD: CPT | Mod: CPTII,S$GLB,, | Performed by: INTERNAL MEDICINE

## 2024-05-23 PROCEDURE — 99999 PR PBB SHADOW E&M-EST. PATIENT-LVL III: CPT | Mod: PBBFAC,,, | Performed by: INTERNAL MEDICINE

## 2024-05-23 PROCEDURE — 99395 PREV VISIT EST AGE 18-39: CPT | Mod: S$GLB,,, | Performed by: INTERNAL MEDICINE

## 2024-05-23 PROCEDURE — 36415 COLL VENOUS BLD VENIPUNCTURE: CPT | Performed by: INTERNAL MEDICINE

## 2024-05-23 PROCEDURE — 85025 COMPLETE CBC W/AUTO DIFF WBC: CPT | Performed by: INTERNAL MEDICINE

## 2024-05-23 PROCEDURE — 80053 COMPREHEN METABOLIC PANEL: CPT | Performed by: INTERNAL MEDICINE

## 2024-05-23 PROCEDURE — 3008F BODY MASS INDEX DOCD: CPT | Mod: CPTII,S$GLB,, | Performed by: INTERNAL MEDICINE

## 2024-05-23 NOTE — PROGRESS NOTES
"    CHIEF COMPLAINT     Chief Complaint   Patient presents with    Annual Exam       HPI     Samule Mcgraw is a 33 y.o. male here today for     HTN   Taking kj 10-20    Making time to workout, time with spouse,  time with kids    Personally Reviewed Patient's Medical, surgical, family and social hx. Changes updated in [facility].  Care Team updated in Epic    Review of Systems:  Review of Systems   Constitutional:  Negative for activity change and unexpected weight change.   HENT:  Negative for hearing loss, rhinorrhea and trouble swallowing.    Eyes:  Negative for discharge and visual disturbance.   Respiratory:  Negative for chest tightness and wheezing.    Cardiovascular:  Negative for chest pain and palpitations.   Gastrointestinal:  Negative for blood in stool, constipation, diarrhea and vomiting.   Endocrine: Negative for polydipsia and polyuria.   Genitourinary:  Negative for difficulty urinating, hematuria and urgency.   Musculoskeletal:  Negative for arthralgias, joint swelling and neck pain.   Neurological:  Negative for weakness and headaches.   Psychiatric/Behavioral:  Negative for confusion and dysphoric mood.        Health Maintenance:   Reviewed with patient  Due for the following:      PHYSICAL EXAM     /72 (BP Location: Right arm, Patient Position: Sitting)   Pulse 80   Ht 5' 9" (1.753 m)   Wt 88.5 kg (195 lb 1.7 oz)   SpO2 97%   BMI 28.81 kg/m²     Gen: Well Appearing, NAD  HEENT: PERR, EOMI  Neck: FROM, no thyromegaly, no cervical adenopathy  CVD: RRR, no M/R/G  Pulm: Normal work of breathing, CTAB, no wheezing  Abd:  Soft, NT, ND non TTP, no mass  MSK: no LE edema  Neuro: A&Ox3, gait normal, speech normal  Mood; Mood normal, behavior normal, thought process linear       LABS     Labs reviewed; Notable for    ASSESSMENT     1. Annual physical exam  CBC Auto Differential    Comprehensive Metabolic Panel    Hemoglobin A1C    Lipid Panel      2. HTN (hypertension), benign                Plan "     Samuel Mcgraw is a 33 y.o. male with  1. Annual physical exam  Updated problem list, medical history, care team and discussed HM.     - CBC Auto Differential; Future  - Comprehensive Metabolic Panel; Future  - Hemoglobin A1C; Future  - Lipid Panel; Future    2. HTN (hypertension), benign  At goal continue kj 10-20    Ernesto Forbes MD

## 2024-06-10 ENCOUNTER — PATIENT MESSAGE (OUTPATIENT)
Dept: INTERNAL MEDICINE | Facility: CLINIC | Age: 34
End: 2024-06-10
Payer: COMMERCIAL

## 2024-07-12 DIAGNOSIS — I10 HTN (HYPERTENSION), BENIGN: ICD-10-CM

## 2024-07-12 RX ORDER — AMLODIPINE AND OLMESARTAN MEDOXOMIL 10; 20 MG/1; MG/1
1 TABLET ORAL
Qty: 90 TABLET | Refills: 3 | Status: SHIPPED | OUTPATIENT
Start: 2024-07-12

## 2024-07-12 NOTE — TELEPHONE ENCOUNTER
Refill Decision Note   Samuel Mcgraw  is requesting a refill authorization.  Brief Assessment and Rationale for Refill:  Approve     Medication Therapy Plan:        Comments:     Note composed:6:31 AM 07/12/2024

## 2024-07-12 NOTE — TELEPHONE ENCOUNTER
No care due was identified.  [facility] Embedded Care Due Messages. Reference number: [id number].   7/12/2024 12:16:14 AM CDT

## 2025-02-22 ENCOUNTER — E-VISIT (OUTPATIENT)
Dept: URGENT CARE | Facility: CLINIC | Age: 35
End: 2025-02-22
Payer: COMMERCIAL

## 2025-02-22 DIAGNOSIS — H01.009 BLEPHARITIS, UNSPECIFIED LATERALITY, UNSPECIFIED TYPE: Primary | ICD-10-CM

## 2025-02-22 RX ORDER — NEOMYCIN SULFATE, POLYMYXIN B SULFATE, AND DEXAMETHASONE 3.5; 10000; 1 MG/G; [USP'U]/G; MG/G
OINTMENT OPHTHALMIC 2 TIMES DAILY
Qty: 3.5 G | Refills: 0 | Status: SHIPPED | OUTPATIENT
Start: 2025-02-22 | End: 2025-03-01

## 2025-02-22 NOTE — PROGRESS NOTES
Patient ID: Samuel Mcgraw is a 34 y.o. male.    Chief Complaint: General Illness (Entered automatically based on patient selection in [employer].)          274}  The patient initiated a request through [employer] on 2/22/2025 for evaluation and management with a chief complaint of General Illness (Entered automatically based on patient selection in [employer].)     I evaluated the questionnaire submission on 02/22/2025 .    Total Time (in minutes): 7     [facility] E[facility]-Skin Hair Nails    2/22/2025  9:19 AM CST - Filed by Patient   What do you need help with? Skin   What concern do you have about your skin? Rash   Do you agree to participate in an E-Visit? Yes   If you have any of the following symptoms, please present to your local emergency room or call 911:  I acknowledge   What is the main issue you would like addressed today? Eyelid rash   How would you describe your skin problem? Rash   When did your symptoms first appear? 2/13/2025   Where is it located?  Face   Does it itch? Yes   Does it hurt? Yes   Where is the pain located? Where the skin change is noted   The pain came on: Suddenly   The pain has the character of: Burning   Frequency of the pain (How often does it appear)? Comes and goes   Please select the face that most closely captures your pain level: 4   Is there discharge or drainage? No   Is there bleeding? No   Describe the character Flat   Describe the color Red   Has it changed over time? No change   Frequency of skin problem Fluctuates at random   Duration of the skin problem (how long does it stay when it is present) Weeks   I have had a new exposure to No new exposures   I have had a new exposure to No new exposures   What have you used to treat the skin problem? Neosporin   If you have used anything for treatment, has it helped the symptoms? Maybe   Other generalized symptoms that you associate with the rash No other symptoms   Provide any additional information you feel is important.     At least one photo is required for treatment to be provided. You can upload a maximum of three photos of the affected area.     Are you able to take your vital signs? No          Active Problem List with Overview Notes    Diagnosis Date Noted    Family history of heart disease 06/10/2021    H/O cold sores 06/10/2021      Recent Labs Obtained:  Lab Results   Component Value Date    WBC 7.57 05/23/2024    HGB 15.1 05/23/2024    HCT 44.7 05/23/2024    MCV 91 05/23/2024     05/23/2024     05/23/2024    K 4.2 05/23/2024    GLU 99 05/23/2024    CREATININE 1.0 05/23/2024    EGFRNORACEVR >60.0 05/23/2024    HGBA1C 5.3 05/23/2024    TSH 2.190 06/25/2020      Review of patient's allergies indicates:  No Known Allergies    Encounter Diagnosis   Name Primary?    Blepharitis, unspecified laterality, unspecified type Yes        No orders of the defined types were placed in this encounter.     Medications Ordered This Encounter   Medications    neomycin-polymyxin-dexamethasone (DEXACINE) 3.5 mg/g-10,000 unit/g-0.1 % Oint     Sig: Place into both eyes 2 (two) times daily. for 7 days     Dispense:  3.5 g     Refill:  0        E-Visit Time Tracking:    Day 1 Time (in minutes): 7    Total Time (in minutes): 7      274}

## 2025-04-28 ENCOUNTER — PATIENT MESSAGE (OUTPATIENT)
Dept: INTERNAL MEDICINE | Facility: CLINIC | Age: 35
End: 2025-04-28
Payer: COMMERCIAL

## 2025-04-29 ENCOUNTER — LAB VISIT (OUTPATIENT)
Dept: LAB | Facility: HOSPITAL | Age: 35
End: 2025-04-29
Attending: INTERNAL MEDICINE
Payer: COMMERCIAL

## 2025-04-29 ENCOUNTER — RESULTS FOLLOW-UP (OUTPATIENT)
Dept: INTERNAL MEDICINE | Facility: CLINIC | Age: 35
End: 2025-04-29

## 2025-04-29 ENCOUNTER — OFFICE VISIT (OUTPATIENT)
Dept: INTERNAL MEDICINE | Facility: CLINIC | Age: 35
End: 2025-04-29
Payer: COMMERCIAL

## 2025-04-29 VITALS
WEIGHT: 206.13 LBS | DIASTOLIC BLOOD PRESSURE: 78 MMHG | SYSTOLIC BLOOD PRESSURE: 130 MMHG | BODY MASS INDEX: 30.44 KG/M2 | OXYGEN SATURATION: 99 % | HEART RATE: 80 BPM

## 2025-04-29 DIAGNOSIS — Z00.00 ANNUAL PHYSICAL EXAM: ICD-10-CM

## 2025-04-29 DIAGNOSIS — I10 HTN (HYPERTENSION), BENIGN: ICD-10-CM

## 2025-04-29 DIAGNOSIS — Z00.00 ANNUAL PHYSICAL EXAM: Primary | ICD-10-CM

## 2025-04-29 LAB
ABSOLUTE EOSINOPHIL (OHS): 0.12 K/UL
ABSOLUTE MONOCYTE (OHS): 0.64 K/UL (ref 0.3–1)
ABSOLUTE NEUTROPHIL COUNT (OHS): 4.5 K/UL (ref 1.8–7.7)
ALBUMIN SERPL BCP-MCNC: 4.5 G/DL (ref 3.5–5.2)
ALP SERPL-CCNC: 87 UNIT/L (ref 40–150)
ALT SERPL W/O P-5'-P-CCNC: 55 UNIT/L (ref 10–44)
ANION GAP (OHS): 10 MMOL/L (ref 8–16)
AST SERPL-CCNC: 27 UNIT/L (ref 11–45)
BASOPHILS # BLD AUTO: 0.06 K/UL
BASOPHILS NFR BLD AUTO: 0.7 %
BILIRUB SERPL-MCNC: 0.6 MG/DL (ref 0.1–1)
BUN SERPL-MCNC: 18 MG/DL (ref 6–20)
CALCIUM SERPL-MCNC: 10 MG/DL (ref 8.7–10.5)
CHLORIDE SERPL-SCNC: 103 MMOL/L (ref 95–110)
CHOLEST SERPL-MCNC: 231 MG/DL (ref 120–199)
CHOLEST/HDLC SERPL: 4.1 {RATIO} (ref 2–5)
CO2 SERPL-SCNC: 26 MMOL/L (ref 23–29)
CREAT SERPL-MCNC: 1 MG/DL (ref 0.5–1.4)
EAG (OHS): 103 MG/DL (ref 68–131)
ERYTHROCYTE [DISTWIDTH] IN BLOOD BY AUTOMATED COUNT: 13.2 % (ref 11.5–14.5)
GFR SERPLBLD CREATININE-BSD FMLA CKD-EPI: >60 ML/MIN/1.73/M2
GLUCOSE SERPL-MCNC: 97 MG/DL (ref 70–110)
HBA1C MFR BLD: 5.2 % (ref 4–5.6)
HCT VFR BLD AUTO: 49.7 % (ref 40–54)
HDLC SERPL-MCNC: 57 MG/DL (ref 40–75)
HDLC SERPL: 24.7 % (ref 20–50)
HGB BLD-MCNC: 16.1 GM/DL (ref 14–18)
IMM GRANULOCYTES # BLD AUTO: 0.15 K/UL (ref 0–0.04)
IMM GRANULOCYTES NFR BLD AUTO: 1.8 % (ref 0–0.5)
LDLC SERPL CALC-MCNC: 147.4 MG/DL (ref 63–159)
LYMPHOCYTES # BLD AUTO: 2.71 K/UL (ref 1–4.8)
MCH RBC QN AUTO: 30 PG (ref 27–31)
MCHC RBC AUTO-ENTMCNC: 32.4 G/DL (ref 32–36)
MCV RBC AUTO: 93 FL (ref 82–98)
NONHDLC SERPL-MCNC: 174 MG/DL
NUCLEATED RBC (/100WBC) (OHS): 0 /100 WBC
PLATELET # BLD AUTO: 301 K/UL (ref 150–450)
PMV BLD AUTO: 11.3 FL (ref 9.2–12.9)
POTASSIUM SERPL-SCNC: 5.1 MMOL/L (ref 3.5–5.1)
PROT SERPL-MCNC: 7.8 GM/DL (ref 6–8.4)
RBC # BLD AUTO: 5.37 M/UL (ref 4.6–6.2)
RELATIVE EOSINOPHIL (OHS): 1.5 %
RELATIVE LYMPHOCYTE (OHS): 33.1 % (ref 18–48)
RELATIVE MONOCYTE (OHS): 7.8 % (ref 4–15)
RELATIVE NEUTROPHIL (OHS): 55.1 % (ref 38–73)
SODIUM SERPL-SCNC: 139 MMOL/L (ref 136–145)
TRIGL SERPL-MCNC: 133 MG/DL (ref 30–150)
WBC # BLD AUTO: 8.18 K/UL (ref 3.9–12.7)

## 2025-04-29 PROCEDURE — 99999 PR PBB SHADOW E&M-EST. PATIENT-LVL III: CPT | Mod: PBBFAC,,, | Performed by: INTERNAL MEDICINE

## 2025-04-29 PROCEDURE — 3008F BODY MASS INDEX DOCD: CPT | Mod: CPTII,S$GLB,, | Performed by: INTERNAL MEDICINE

## 2025-04-29 PROCEDURE — 83036 HEMOGLOBIN GLYCOSYLATED A1C: CPT

## 2025-04-29 PROCEDURE — 99395 PREV VISIT EST AGE 18-39: CPT | Mod: S$GLB,,, | Performed by: INTERNAL MEDICINE

## 2025-04-29 PROCEDURE — 4010F ACE/ARB THERAPY RXD/TAKEN: CPT | Mod: CPTII,S$GLB,, | Performed by: INTERNAL MEDICINE

## 2025-04-29 PROCEDURE — 80061 LIPID PANEL: CPT

## 2025-04-29 PROCEDURE — 36415 COLL VENOUS BLD VENIPUNCTURE: CPT

## 2025-04-29 PROCEDURE — 3075F SYST BP GE 130 - 139MM HG: CPT | Mod: CPTII,S$GLB,, | Performed by: INTERNAL MEDICINE

## 2025-04-29 PROCEDURE — 82040 ASSAY OF SERUM ALBUMIN: CPT

## 2025-04-29 PROCEDURE — 85025 COMPLETE CBC W/AUTO DIFF WBC: CPT

## 2025-04-29 PROCEDURE — 1160F RVW MEDS BY RX/DR IN RCRD: CPT | Mod: CPTII,S$GLB,, | Performed by: INTERNAL MEDICINE

## 2025-04-29 PROCEDURE — 1159F MED LIST DOCD IN RCRD: CPT | Mod: CPTII,S$GLB,, | Performed by: INTERNAL MEDICINE

## 2025-04-29 PROCEDURE — 3078F DIAST BP <80 MM HG: CPT | Mod: CPTII,S$GLB,, | Performed by: INTERNAL MEDICINE

## 2025-04-29 RX ORDER — AMLODIPINE AND OLMESARTAN MEDOXOMIL 10; 20 MG/1; MG/1
1 TABLET ORAL DAILY
Qty: 90 TABLET | Refills: 3 | Status: SHIPPED | OUTPATIENT
Start: 2025-04-29

## 2025-04-29 NOTE — PROGRESS NOTES
CHIEF COMPLAINT     Chief Complaint   Patient presents with    Annual Exam    blood work       HPI     Samuel Mcgraw is a 34 y.o. male hypertension here today for Annual exam    Hypertension   On Rajinder 10-20 blood pressure at goal no adverse effects tolerating medication well    Has noticed some increased postnasal drip overnight particularly bothersome in the morning reports having some thicker phlegm that clearing his throat in the morning.  Reports he occasionally gags.  Is using sinus rinse in the evenings    Personally Reviewed Patient's Medical, surgical, family and social hx. Changes updated in [facility].  Care Team updated in Epic    Review of Systems:  Review of Systems   Constitutional:  Negative for activity change and unexpected weight change.   HENT:  Negative for hearing loss, rhinorrhea and trouble swallowing.    Eyes:  Negative for discharge and visual disturbance.   Respiratory:  Negative for chest tightness and wheezing.    Cardiovascular:  Negative for chest pain and palpitations.   Gastrointestinal:  Negative for blood in stool, constipation, diarrhea and vomiting.   Endocrine: Negative for polydipsia and polyuria.   Genitourinary:  Negative for difficulty urinating, hematuria and urgency.   Musculoskeletal:  Negative for arthralgias, joint swelling and neck pain.   Neurological:  Negative for weakness and headaches.   Psychiatric/Behavioral:  Negative for confusion and dysphoric mood.        Health Maintenance:   Reviewed with patient  Due for the following:      PHYSICAL EXAM     /78   Pulse 80   Wt 93.5 kg (206 lb 2.1 oz)   SpO2 99%   BMI 30.44 kg/m²     Gen: Well Appearing, NAD  HEENT: PERR, EOMI copious mucus and posterior pharynx  Neck: FROM, no thyromegaly, no cervical adenopathy  CVD: RRR, no M/R/G  Pulm: Normal work of breathing, CTAB, no wheezing  Abd:  Soft, NT, ND non TTP, no mass  MSK: no LE edema  Neuro: A&Ox3, gait normal, speech normal  Mood; Mood normal, behavior  normal, thought process linear       LABS     Labs reviewed; Notable for    ASSESSMENT     1. Annual physical exam  CBC Auto Differential    Comprehensive Metabolic Panel    Hemoglobin A1C    Lipid Panel      2. HTN (hypertension), benign  amlodipine-olmesartan (GOMEZ) 10-20 mg per tablet              Plan     Samuel Mcgraw is a 34 y.o. male with  1. Annual physical exam  Updated problem list, medical history, care team and discussed HM.   Will let me know if he wants further testing regarding if symptoms do not improve after further addressing the postnasal drip  - CBC Auto Differential; Future  - Comprehensive Metabolic Panel; Future  - Hemoglobin A1C; Future  - Lipid Panel; Future    2. HTN (hypertension), benign  At goal continue current regimen  - amlodipine-olmesartan (GOMEZ) 10-20 mg per tablet; Take 1 tablet by mouth once daily.  Dispense: 90 tablet; Refill: 3      Ernesto Forbes MD